# Patient Record
Sex: FEMALE | Race: WHITE | NOT HISPANIC OR LATINO | Employment: UNEMPLOYED | URBAN - METROPOLITAN AREA
[De-identification: names, ages, dates, MRNs, and addresses within clinical notes are randomized per-mention and may not be internally consistent; named-entity substitution may affect disease eponyms.]

---

## 2017-02-06 ENCOUNTER — PATIENT MESSAGE (OUTPATIENT)
Dept: FAMILY MEDICINE | Facility: CLINIC | Age: 62
End: 2017-02-06

## 2017-02-06 RX ORDER — BUPROPION HYDROCHLORIDE 150 MG/1
150 TABLET ORAL DAILY
Qty: 30 TABLET | Refills: 11 | Status: SHIPPED | OUTPATIENT
Start: 2017-02-06 | End: 2017-05-05 | Stop reason: SDUPTHER

## 2017-03-31 ENCOUNTER — TELEPHONE (OUTPATIENT)
Dept: FAMILY MEDICINE | Facility: CLINIC | Age: 62
End: 2017-03-31

## 2017-03-31 DIAGNOSIS — Z12.31 SCREENING MAMMOGRAM, ENCOUNTER FOR: Primary | ICD-10-CM

## 2017-03-31 NOTE — TELEPHONE ENCOUNTER
----- Message from Sandra Chiu sent at 3/31/2017 10:56 AM CDT -----  Contact: self  Pt is requesting a mammo. Please advise. [phone]

## 2017-04-13 DIAGNOSIS — Z11.59 NEED FOR HEPATITIS C SCREENING TEST: ICD-10-CM

## 2017-05-05 ENCOUNTER — HOSPITAL ENCOUNTER (OUTPATIENT)
Dept: RADIOLOGY | Facility: HOSPITAL | Age: 62
Discharge: HOME | End: 2017-05-05
Attending: INTERNAL MEDICINE
Payer: COMMERCIAL

## 2017-05-05 ENCOUNTER — OFFICE VISIT (OUTPATIENT)
Dept: FAMILY MEDICINE | Facility: CLINIC | Age: 62
End: 2017-05-05
Payer: COMMERCIAL

## 2017-05-05 VITALS
HEIGHT: 62 IN | OXYGEN SATURATION: 95 % | SYSTOLIC BLOOD PRESSURE: 138 MMHG | DIASTOLIC BLOOD PRESSURE: 90 MMHG | TEMPERATURE: 98 F | BODY MASS INDEX: 21.83 KG/M2 | HEART RATE: 78 BPM | WEIGHT: 118.63 LBS

## 2017-05-05 DIAGNOSIS — Z00.00 ROUTINE MEDICAL EXAM: Primary | ICD-10-CM

## 2017-05-05 DIAGNOSIS — E55.9 VITAMIN D DEFICIENCY DISEASE: ICD-10-CM

## 2017-05-05 DIAGNOSIS — F41.9 ANXIETY DISORDER, UNSPECIFIED TYPE: ICD-10-CM

## 2017-05-05 DIAGNOSIS — F32.A DEPRESSION, UNSPECIFIED DEPRESSION TYPE: ICD-10-CM

## 2017-05-05 DIAGNOSIS — G47.00 INSOMNIA, UNSPECIFIED TYPE: ICD-10-CM

## 2017-05-05 DIAGNOSIS — R94.6 ABNORMAL THYROID FUNCTION TEST: ICD-10-CM

## 2017-05-05 DIAGNOSIS — F17.200 TOBACCO USE DISORDER: ICD-10-CM

## 2017-05-05 DIAGNOSIS — F42.9 OBSESSIVE-COMPULSIVE DISORDER, UNSPECIFIED TYPE: ICD-10-CM

## 2017-05-05 DIAGNOSIS — Z12.31 SCREENING MAMMOGRAM, ENCOUNTER FOR: ICD-10-CM

## 2017-05-05 DIAGNOSIS — D75.1 POLYCYTHEMIA, SECONDARY: ICD-10-CM

## 2017-05-05 PROCEDURE — 77067 SCR MAMMO BI INCL CAD: CPT | Mod: 26,,, | Performed by: RADIOLOGY

## 2017-05-05 PROCEDURE — 77067 SCR MAMMO BI INCL CAD: CPT | Mod: TC

## 2017-05-05 PROCEDURE — 77063 BREAST TOMOSYNTHESIS BI: CPT | Mod: 26,,, | Performed by: RADIOLOGY

## 2017-05-05 PROCEDURE — 99999 PR PBB SHADOW E&M-EST. PATIENT-LVL III: CPT | Mod: PBBFAC,,, | Performed by: INTERNAL MEDICINE

## 2017-05-05 PROCEDURE — 99396 PREV VISIT EST AGE 40-64: CPT | Mod: S$GLB,,, | Performed by: INTERNAL MEDICINE

## 2017-05-05 RX ORDER — ZOLPIDEM TARTRATE 10 MG/1
TABLET ORAL
Qty: 30 TABLET | Refills: 5 | Status: SHIPPED | OUTPATIENT
Start: 2017-05-05 | End: 2017-11-20 | Stop reason: SDUPTHER

## 2017-05-05 RX ORDER — SERTRALINE HYDROCHLORIDE 50 MG/1
50 TABLET, FILM COATED ORAL DAILY
Qty: 30 TABLET | Refills: 11 | Status: SHIPPED | OUTPATIENT
Start: 2017-05-05 | End: 2018-04-18

## 2017-05-05 RX ORDER — ALPRAZOLAM 0.25 MG/1
0.25 TABLET ORAL 3 TIMES DAILY PRN
Qty: 60 TABLET | Refills: 5 | Status: SHIPPED | OUTPATIENT
Start: 2017-05-05 | End: 2018-05-16

## 2017-05-05 RX ORDER — BUPROPION HYDROCHLORIDE 150 MG/1
150 TABLET ORAL DAILY
Qty: 30 TABLET | Refills: 11 | Status: SHIPPED | OUTPATIENT
Start: 2017-05-05 | End: 2017-08-25 | Stop reason: SDUPTHER

## 2017-05-05 NOTE — PROGRESS NOTES
CHIEF COMPLAINT:  Physical.                                                                                                                               HISTORY OF PRESENT ILLNESS:  A 62-year-old white female, does a little bit   of exercise at home..  Her mammogram is due.  Cholesterol normal in 2010 and  prior.  She has never yet had a bone density -she called her insurance and apparently is not covered with her 5000 all her deductible.  She has quit smoking for the last couple of months and using electronic cigarettes, due to stress and anxiety she is back smoking again.  Her  does not want to quit.  Her vitamin D levels  previously have been normal and taken off Estratest by gyn -dec libido.  Up-to-date on colonoscopy from 2012 with a 7 year follow-up recommended.  She does feel more depressed.  She has been off Zoloft for quite some time and wants to restart.  She doesn't tolerate a higher dose of Wellbutrin.  We discussed always trying to use smaller doses of Xanax and Ambien and hopefully as we treat her depression she will be able to do so in 6-8 weeks                                                                                            ROS:   CONST: weight stable. EYES: no vision change. ENT: no sore throat. CV: no chest pain w/ exertion. RESP: no shortness of breath. GI: no nausea, vomiting, diarrhea. No dysphagia. : no urinary issues. MUSCULOSKELETAL: no new myalgias or arthralgias. SKIN: no new changes. NEURO: no focal deficits. PSYCH: no new issues. ENDOCRINE: no polyuria. HEME: no lymph nodes. ALLERGY: no general pruritis.                                 PAST MEDICAL HISTORY:                                                        1.  Collagenous colitis, on and off steroids for about two years,  Scope around 2002 with Dr. Hart. Mild chronic inflammation on scope 6/12- 7 yrs                                  2.  History of elevated TSH.                                                  3.  Total hysterectomy for benign reasons and endometriosis found.           4.  C-sections x 2.                                                          5.  Active smoker.                                                           6.  History of depression.                                                   7.  Secondary polycythemia, seen by Hematology.                              8.  Acne, followed by Dermatology.  9.  Vit D def- Dx   10. BMD Not yet done  11.  Obsessive-compulsive disorder  12.  Anxiety disorder                                                                                                                        SOCIAL HISTORY:  Did Smoke half pack per day -4 cigs now.  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  Drinks 3-4 times per week.  .                                                                                                           FAMILY HISTORY:  Mom had benign pancreas growths removed and diabetes.       Father  at 78 of hypertension and diabetes.  One brother, three sisters  and one of her sisters has diabetes.                                                                                                    PHYSICAL EXAMINATION:                                                        VITAL SIGNS: As above,  weight 123.                       GENERAL:  Pleasant, fit appearing.                                           HEENT:  Conjunctivae clear.  Pupils equal and reactive.  Nose and mouth      clear and moist.  Teeth good.                                                NECK:  Supple.  Thyroid nonpalpable.                                         RESPIRATORY:  Effort is good.                                                LUNGS:  Clear.                                                               HEART:  Regular rate and rhythm without murmurs, gallops or rubs.  No        carotid bruits.  No edema.                                                   ABDOMEN:   "Soft, nondistended, nontender.  No hepatosplenomegaly or masses.   SKIN:  No rashes.  Warm to touch.                                            EXTREMITIES:  Gait normal.  Digits without clubbing or cyanosis.                                                                                          Prior labs and x-ray reports reviewed.                                                                                                                    Marcie SOSA was seen today for annual exam.    Diagnoses and all orders for this visit:    Routine medical exam, hopefully will get a bone density in the future but her financial coverage does not allow right now.  Up-to-date on colonoscopy.  Check labs.  Work on smoking cessation which I think may be more possible after we treat her depression  -     TSH; Future  -     Lipid panel; Future  -     CBC auto differential; Future  -     Comprehensive metabolic panel; Future  -     Vitamin D; Future    Screening mammogram, encounter for  -     Scheduled for today    Polycythemia, secondary, reassess    Tobacco use disorder    Vitamin D deficiency disease, reassess    Abnormal thyroid function test, reassess    Anxiety disorder, unspecified type, uncontrolled right now we will add back her Zoloft, continue Wellbutrin    Obsessive-compulsive disorder, unspecified type    Insomnia, unspecified type    Depression, unspecified depression type    Other orders  -     sertraline (ZOLOFT) 50 MG tablet; Take 1 tablet (50 mg total) by mouth once daily.  -     alprazolam (XANAX) 0.25 MG tablet; Take 1 tablet (0.25 mg total) by mouth 3 (three) times daily as needed.  -     buPROPion (WELLBUTRIN XL) 150 MG TB24 tablet; Take 1 tablet (150 mg total) by mouth once daily.  -     zolpidem (AMBIEN) 10 mg Tab; Take 1/2 to 1 tablet by mouth at bedtime as needed     Clinical note will be sensitive based on the above psychiatric issues"This note will not be shared with the patient."  "

## 2017-08-24 RX ORDER — BUPROPION HYDROCHLORIDE 150 MG/1
150 TABLET ORAL DAILY
Qty: 30 TABLET | Refills: 11 | Status: CANCELLED | OUTPATIENT
Start: 2017-08-24 | End: 2018-08-24

## 2017-08-24 NOTE — TELEPHONE ENCOUNTER
----- Message from Jaky Grullon sent at 8/23/2017  4:37 PM CDT -----  Contact: Self   Patient says she need a PA to have her medication refilled. Please call at [phone]    buPROPion (WELLBUTRIN XL) 150 MG TB24 tablet    US scripts( Smoking Cessation )

## 2017-08-25 ENCOUNTER — PATIENT MESSAGE (OUTPATIENT)
Dept: FAMILY MEDICINE | Facility: CLINIC | Age: 62
End: 2017-08-25

## 2017-08-25 RX ORDER — BUPROPION HYDROCHLORIDE 150 MG/1
150 TABLET ORAL DAILY
Qty: 30 TABLET | Refills: 11 | Status: SHIPPED | OUTPATIENT
Start: 2017-08-25 | End: 2018-04-18

## 2017-08-25 NOTE — TELEPHONE ENCOUNTER
----- Message from Roxana Khan sent at 8/25/2017  9:59 AM CDT -----  Contact: self  Pt is checking on the status on a PA for her [place], she call insurance and told her that they never received anything. Pt said to ca insurance [phone] give them a verbal and tell them that it is urgent. Thanks......Josephine

## 2017-08-28 ENCOUNTER — PATIENT MESSAGE (OUTPATIENT)
Dept: FAMILY MEDICINE | Facility: CLINIC | Age: 62
End: 2017-08-28

## 2017-08-28 ENCOUNTER — TELEPHONE (OUTPATIENT)
Dept: FAMILY MEDICINE | Facility: CLINIC | Age: 62
End: 2017-08-28

## 2017-08-28 NOTE — TELEPHONE ENCOUNTER
----- Message from Maryana Cantu sent at 8/25/2017  4:04 PM CDT -----  Contact: [facility]  Pharmacist calling regarding pt meds. Please call [phone]

## 2017-08-28 NOTE — TELEPHONE ENCOUNTER
PA completed. This insurance plan only cover 196 days. Patient is at 180 days. Possible denial. Only has 16 more days. Patient is aware.

## 2017-08-28 NOTE — TELEPHONE ENCOUNTER
Me   to Marcie Herrera           7:44 AM   Good morning     Your PA was done. Your insurance advised me that your plan only cover 196 days. You are currently at 180 days. Their is a possibility that your PA can be denied. You only have 16 more days left. Please give your insurance 24-48 hours to make a decision. They will contact you. If you need any further assistance concerning this matter please contact office.

## 2017-08-30 ENCOUNTER — OFFICE VISIT (OUTPATIENT)
Dept: FAMILY MEDICINE | Facility: CLINIC | Age: 62
End: 2017-08-30
Payer: COMMERCIAL

## 2017-08-30 VITALS
HEIGHT: 62 IN | BODY MASS INDEX: 22.68 KG/M2 | SYSTOLIC BLOOD PRESSURE: 110 MMHG | OXYGEN SATURATION: 96 % | WEIGHT: 123.25 LBS | DIASTOLIC BLOOD PRESSURE: 70 MMHG | TEMPERATURE: 98 F | HEART RATE: 75 BPM

## 2017-08-30 DIAGNOSIS — M67.911 TENDINOPATHY OF ROTATOR CUFF, RIGHT: Primary | ICD-10-CM

## 2017-08-30 DIAGNOSIS — G56.01 CARPAL TUNNEL SYNDROME OF RIGHT WRIST: ICD-10-CM

## 2017-08-30 PROCEDURE — 99999 PR PBB SHADOW E&M-EST. PATIENT-LVL IV: CPT | Mod: PBBFAC,,, | Performed by: NURSE PRACTITIONER

## 2017-08-30 PROCEDURE — 3008F BODY MASS INDEX DOCD: CPT | Mod: S$GLB,,, | Performed by: NURSE PRACTITIONER

## 2017-08-30 PROCEDURE — 99214 OFFICE O/P EST MOD 30 MIN: CPT | Mod: S$GLB,,, | Performed by: NURSE PRACTITIONER

## 2017-08-30 RX ORDER — NAPROXEN 500 MG/1
500 TABLET ORAL 2 TIMES DAILY WITH MEALS
Qty: 60 TABLET | Refills: 2 | Status: SHIPPED | OUTPATIENT
Start: 2017-08-30 | End: 2017-11-28

## 2017-08-30 RX ORDER — METHYLPREDNISOLONE 4 MG/1
TABLET ORAL
Qty: 1 PACKAGE | Refills: 0 | Status: SHIPPED | OUTPATIENT
Start: 2017-08-30 | End: 2017-09-20

## 2017-08-30 NOTE — PATIENT INSTRUCTIONS
Carpal Tunnel Syndrome    Carpal tunnel syndrome is a painful condition of the wrist and arm. It is caused by pressure on the median nerve.  The median nerve is one of the nerves that give feeling and movement to the hand. It passes through a tunnel in the wrist called the carpal tunnel. This tunnel is made up of bones and ligaments. Narrowing of this tunnel or swelling of the tissues inside the tunnel puts pressure on the median nerve. This causes numbness, pins and needles, or electric shooting pains in your hand and forearm. Often the pain is worse at night and may wake you when you are asleep.  Carpal tunnel syndrome may occur during pregnancy and with use of birth control pills. It is more common in workers who must often bend their wrists. It is also common in people who work with power tools that cause strong vibrations.  Home care  · Rest the painful wrist. Avoid repeated bending of the wrist back and forth. This puts pressure on the median nerve. Avoid using power tools with strong vibrations.  · If you were given a splint, wear it at night while you sleep. You may also wear it during the day for comfort.  · Move your fingers and wrists often to avoid stiffness.  · Elevate your arms on pillows when you lie down.  · Try using the unaffected hand more.  · Try not to hold your wrists in a bent, downward position.  · Sometimes changes in the work place may ease symptoms. If you type most of the day, it may help to change the position of your keyboard or add a wrist support. Your wrist should be in a neutral position and not bent back when typing.  · You may use over-the-counter pain medicine to treat pain and inflammation, unless another medicine was prescribed. Anti-inflammatory pain medicines, such as ibuprofen or naproxen may be more effective than acetaminophen, which treats pain, but not inflammation. If you have chronic liver or kidney disease or ever had a stomach ulcer or GI bleeding, talk with your  doctor before using these medicines.  · Opioid pain medicine will only give temporary relief and does not treat the problem. If pain continues, you may need a shot of a steroid drug into your wrist.  · If the above methods fail, you may need surgery. This will open the carpal tunnel and release the pressure on the trapped nerve.  Follow-up care  Follow up with your healthcare provider, or as advised, if the pain doesnt begin to improve within the next week.  If X-rays were taken, you will be notified of any new findings that may affect your care.  When to seek medical advice  Call your healthcare provider right away if any of these occur:  · Pain not improving with the above treatment  · Fingers or hand become cold, blue, numb, or tingly  · Your whole arm becomes swollen or weak  Date Last Reviewed: 11/23/2015  © 2131-3638 [employer]. [address] [place] [address], [place] [address]. All rights reserved. This information is not intended as a substitute for professional medical care. Always follow your healthcare professional's instructions.

## 2017-08-30 NOTE — PROGRESS NOTES
History of Present Illness   Marcie Herrera is a 62 y.o. woman with medical history as listed below who presents today for evaluation of right wrist and right shoulder pain for about one week. She states one week ago she was using a stapler at work when she felt a sudden pain to right wrist. The pain was sharp and radiating up the forearm. She began wearing a carpal tunnel brace and taking ibuprofen which helps to relieve pain. 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 She also complains of right shoulder pain. Pain is present in the morning when she wakes up, she does sleep on right side. The pain is also present with certain shoulder movements. The pain radiates in to the upper arm. She first noticed the pain after playing the Wii with her grandson. There is no associated numbness or tingling. There is no known injury to either shoulder or wrist and there is no obvious deformity. She has no additional complaints and is otherwise healthy on today's visit.    Past Medical History:   Diagnosis Date    Anxiety     Insomnia        Past Surgical History:   Procedure Laterality Date     SECTION      HYSTERECTOMY         Social History     Social History    Marital status:      Spouse name: N/A    Number of children: N/A    Years of education: N/A     Social History Main Topics    Smoking status: Current Every Day Smoker     Packs/day: 0.25     Years: 20.00    Smokeless tobacco: Never Used    Alcohol use Yes    Drug use: No    Sexual activity: Yes     Partners: Male     Other Topics Concern    None     Social History Narrative    None       Family History   Problem Relation Age of Onset    Diabetes Father     Hypertension Father     Diabetes Mother        Review of Systems  Review of Systems   Constitutional: Negative for chills and fever.   Musculoskeletal: Positive for joint pain (right wrist, right shoulder). Negative for back pain, falls and neck pain.  "  Skin: Negative for itching and rash.   Neurological: Negative for tingling, sensory change, focal weakness and headaches.     A complete review of systems was otherwise negative.    Physical Exam  /70 (BP Location: Right arm, Patient Position: Sitting)   Pulse 75   Temp 97.8 °F (36.6 °C) (Oral)   Ht 5' 2" (1.575 m)   Wt 55.9 kg (123 lb 3.8 oz)   LMP  (Approximate)   SpO2 96%   BMI 22.54 kg/m²   General appearance: alert, appears stated age, cooperative and no distress  Head: Normocephalic, without obvious abnormality, atraumatic  Neck: supple, symmetrical, trachea midline and full ROM with no tenderness  Back: symmetric, no curvature. ROM normal. No CVA tenderness.  Extremities: extremities normal, atraumatic, no cyanosis or edema  Pulses: 2+ and symmetric  Skin: Skin color, texture, turgor normal. No rashes or lesions  Neurologic: Grossly normal  Musculoskeletal: Positive Phalen's test to right wrist. No obvious swelling or deformity, no joint crepitus. Pulse and sensation intact. There is pain with ROM to right shoulder, anterior rotation and elevation. There is no redness, warmth, or obvious deformity. Distal sensation, ROM, and pulses intact. Full ROM to right elbow.    Assessment/Plan  Tendinopathy of rotator cuff, right  Start medrol dose pack, take as instructed.  Avoid heavy lifting or strenuous activity.  May use ice or heat as needed.  RTC PRN for worsening symptoms.  -     methylPREDNISolone (MEDROL DOSEPACK) 4 mg tablet; use as directed  Dispense: 1 Package; Refill: 0    Carpal tunnel syndrome of right wrist  Continue to wear carpal tunnel brace.  Naproxen as needed.  We discussed surgery as a future option for more persistent or worsening pain.  -     naproxen (EC NAPROSYN) 500 MG EC tablet; Take 1 tablet (500 mg total) by mouth 2 (two) times daily with meals.  Dispense: 60 tablet; Refill: 2    She has verbalized understanding and is in agreement with plan of care.  Return if symptoms " worsen or fail to improve.

## 2017-08-31 ENCOUNTER — PATIENT MESSAGE (OUTPATIENT)
Dept: FAMILY MEDICINE | Facility: CLINIC | Age: 62
End: 2017-08-31

## 2017-09-18 ENCOUNTER — TELEPHONE (OUTPATIENT)
Dept: FAMILY MEDICINE | Facility: CLINIC | Age: 62
End: 2017-09-18

## 2017-09-18 NOTE — TELEPHONE ENCOUNTER
----- Message from Lian Moreau sent at 9/8/2017  9:23 AM CDT -----  Contact: self  Pt states she is returning a call. Please call [phone].

## 2017-11-21 RX ORDER — ZOLPIDEM TARTRATE 10 MG/1
TABLET ORAL
Qty: 30 TABLET | Refills: 5 | Status: SHIPPED | OUTPATIENT
Start: 2017-11-21 | End: 2018-05-16

## 2018-01-03 ENCOUNTER — PATIENT MESSAGE (OUTPATIENT)
Dept: FAMILY MEDICINE | Facility: CLINIC | Age: 63
End: 2018-01-03

## 2018-01-03 DIAGNOSIS — G56.00 CARPAL TUNNEL SYNDROME, UNSPECIFIED LATERALITY: Primary | ICD-10-CM

## 2018-01-03 RX ORDER — METHYLPREDNISOLONE 4 MG/1
TABLET ORAL
Qty: 1 PACKAGE | Refills: 0 | Status: SHIPPED | OUTPATIENT
Start: 2018-01-03 | End: 2018-01-24

## 2018-01-31 ENCOUNTER — PATIENT MESSAGE (OUTPATIENT)
Dept: FAMILY MEDICINE | Facility: CLINIC | Age: 63
End: 2018-01-31

## 2018-03-06 DIAGNOSIS — G95.89 LUMBAR EPIDURAL MASS: Primary | ICD-10-CM

## 2018-03-08 ENCOUNTER — HOSPITAL ENCOUNTER (OUTPATIENT)
Dept: RADIOLOGY | Facility: HOSPITAL | Age: 63
Discharge: HOME | End: 2018-03-08
Attending: ORTHOPAEDIC SURGERY
Payer: COMMERCIAL

## 2018-03-08 DIAGNOSIS — G95.89 LUMBAR EPIDURAL MASS: ICD-10-CM

## 2018-03-08 PROCEDURE — A9503 TC99M MEDRONATE: HCPCS

## 2018-03-08 PROCEDURE — 78306 BONE IMAGING WHOLE BODY: CPT | Mod: 26,,, | Performed by: RADIOLOGY

## 2018-03-20 PROBLEM — C79.51 BONE METASTASES: Status: ACTIVE | Noted: 2018-03-20

## 2018-03-20 PROBLEM — K59.01 SLOW TRANSIT CONSTIPATION: Status: ACTIVE | Noted: 2018-03-20

## 2018-03-20 PROBLEM — M89.9 BONE LESION: Status: ACTIVE | Noted: 2018-03-20

## 2018-03-20 PROBLEM — K52.831 COLLAGENOUS COLITIS: Status: ACTIVE | Noted: 2018-03-20

## 2018-03-23 ENCOUNTER — HOSPITAL ENCOUNTER (OUTPATIENT)
Dept: RADIOLOGY | Facility: HOSPITAL | Age: 63
Discharge: HOME | End: 2018-03-23
Attending: INTERNAL MEDICINE
Payer: COMMERCIAL

## 2018-03-23 DIAGNOSIS — M89.9 BONE LESION: ICD-10-CM

## 2018-03-23 DIAGNOSIS — C79.51 BONE METASTASES: ICD-10-CM

## 2018-03-23 DIAGNOSIS — R91.8 HILAR MASS: ICD-10-CM

## 2018-03-23 PROCEDURE — 71260 CT THORAX DX C+: CPT | Mod: TC

## 2018-03-23 PROCEDURE — 71260 CT THORAX DX C+: CPT | Mod: 26,,, | Performed by: RADIOLOGY

## 2018-03-23 PROCEDURE — 74177 CT ABD & PELVIS W/CONTRAST: CPT | Mod: 26,,, | Performed by: RADIOLOGY

## 2018-03-23 PROCEDURE — 74177 CT ABD & PELVIS W/CONTRAST: CPT | Mod: TC

## 2018-03-23 PROCEDURE — 25500020 PHARM REV CODE 255: Performed by: INTERNAL MEDICINE

## 2018-03-23 RX ADMIN — IOHEXOL 75 ML: 350 INJECTION, SOLUTION INTRAVENOUS at 03:03

## 2018-03-23 RX ADMIN — IOHEXOL 15 ML: 300 INJECTION, SOLUTION INTRAVENOUS at 03:03

## 2018-04-17 ENCOUNTER — PATIENT MESSAGE (OUTPATIENT)
Dept: FAMILY MEDICINE | Facility: CLINIC | Age: 63
End: 2018-04-17

## 2018-04-20 ENCOUNTER — LAB VISIT (OUTPATIENT)
Dept: LAB | Facility: HOSPITAL | Age: 63
End: 2018-04-20
Attending: INTERNAL MEDICINE
Payer: COMMERCIAL

## 2018-04-20 DIAGNOSIS — C34.90 LUNG CANCER: Primary | ICD-10-CM

## 2018-04-20 DIAGNOSIS — D63.8 ANEMIA OF CHRONIC DISEASE: ICD-10-CM

## 2018-04-20 LAB
ABO + RH BLD: NORMAL
BASOPHILS # BLD AUTO: 0.03 K/UL
BASOPHILS NFR BLD: 0.5 %
BLD GP AB SCN CELLS X3 SERPL QL: NORMAL
DIFFERENTIAL METHOD: ABNORMAL
EOSINOPHIL # BLD AUTO: 0.3 K/UL
EOSINOPHIL NFR BLD: 4.6 %
ERYTHROCYTE [DISTWIDTH] IN BLOOD BY AUTOMATED COUNT: 15.9 %
HCT VFR BLD AUTO: 25.9 %
HGB BLD-MCNC: 7.8 G/DL
LYMPHOCYTES # BLD AUTO: 0.4 K/UL
LYMPHOCYTES NFR BLD: 6.5 %
MCH RBC QN AUTO: 28.3 PG
MCHC RBC AUTO-ENTMCNC: 30.1 G/DL
MCV RBC AUTO: 94 FL
MONOCYTES # BLD AUTO: 0.7 K/UL
MONOCYTES NFR BLD: 10.5 %
NEUTROPHILS # BLD AUTO: 5 K/UL
NEUTROPHILS NFR BLD: 76.5 %
PLATELET # BLD AUTO: 488 K/UL
PMV BLD AUTO: 8.6 FL
RBC # BLD AUTO: 2.76 M/UL
WBC # BLD AUTO: 6.48 K/UL

## 2018-04-20 PROCEDURE — 86920 COMPATIBILITY TEST SPIN: CPT

## 2018-04-20 PROCEDURE — 85025 COMPLETE CBC W/AUTO DIFF WBC: CPT

## 2018-04-20 PROCEDURE — 36415 COLL VENOUS BLD VENIPUNCTURE: CPT

## 2018-04-20 PROCEDURE — 86850 RBC ANTIBODY SCREEN: CPT

## 2018-04-20 RX ORDER — HYDROCODONE BITARTRATE AND ACETAMINOPHEN 500; 5 MG/1; MG/1
TABLET ORAL
Status: DISCONTINUED | OUTPATIENT
Start: 2018-04-20 | End: 2018-04-20 | Stop reason: HOSPADM

## 2018-04-23 ENCOUNTER — INFUSION (OUTPATIENT)
Dept: INFUSION THERAPY | Facility: HOSPITAL | Age: 63
End: 2018-04-23
Attending: INTERNAL MEDICINE
Payer: COMMERCIAL

## 2018-04-23 VITALS
TEMPERATURE: 98 F | RESPIRATION RATE: 18 BRPM | SYSTOLIC BLOOD PRESSURE: 148 MMHG | DIASTOLIC BLOOD PRESSURE: 68 MMHG | HEART RATE: 84 BPM | OXYGEN SATURATION: 95 %

## 2018-04-23 DIAGNOSIS — C34.90 LUNG CANCER: Primary | ICD-10-CM

## 2018-04-23 LAB
BLD PROD TYP BPU: NORMAL
BLD PROD TYP BPU: NORMAL
BLOOD UNIT EXPIRATION DATE: NORMAL
BLOOD UNIT EXPIRATION DATE: NORMAL
BLOOD UNIT TYPE CODE: 5100
BLOOD UNIT TYPE CODE: 5100
BLOOD UNIT TYPE: NORMAL
BLOOD UNIT TYPE: NORMAL
CODING SYSTEM: NORMAL
CODING SYSTEM: NORMAL
DISPENSE STATUS: NORMAL
DISPENSE STATUS: NORMAL
NUM UNITS TRANS PACKED RBC: NORMAL
NUM UNITS TRANS PACKED RBC: NORMAL

## 2018-04-23 PROCEDURE — 36430 TRANSFUSION BLD/BLD COMPNT: CPT

## 2018-04-23 PROCEDURE — 25000003 PHARM REV CODE 250: Performed by: INTERNAL MEDICINE

## 2018-04-23 PROCEDURE — P9038 RBC IRRADIATED: HCPCS

## 2018-04-23 RX ORDER — ACETAMINOPHEN 325 MG/1
650 TABLET ORAL
Status: COMPLETED | OUTPATIENT
Start: 2018-04-23 | End: 2018-04-23

## 2018-04-23 RX ORDER — HYDROCODONE BITARTRATE AND ACETAMINOPHEN 500; 5 MG/1; MG/1
TABLET ORAL
Status: DISCONTINUED | OUTPATIENT
Start: 2018-04-23 | End: 2018-04-23 | Stop reason: HOSPADM

## 2018-04-23 RX ORDER — DIPHENHYDRAMINE HCL 25 MG
25 CAPSULE ORAL
Status: COMPLETED | OUTPATIENT
Start: 2018-04-23 | End: 2018-04-23

## 2018-04-23 RX ADMIN — ACETAMINOPHEN 650 MG: 325 TABLET ORAL at 11:04

## 2018-04-23 RX ADMIN — DIPHENHYDRAMINE HYDROCHLORIDE 25 MG: 25 CAPSULE ORAL at 11:04

## 2018-04-23 NOTE — PLAN OF CARE
Problem: Patient Care Overview (Adult)  Goal: Plan of Care Review  Outcome: Ongoing (interventions implemented as appropriate)  Tolerated 2u PRBC, VSS. No reactions noted. Discharged in wheelchair, accompanied by .

## 2018-04-30 ENCOUNTER — OFFICE VISIT (OUTPATIENT)
Dept: FAMILY MEDICINE | Facility: CLINIC | Age: 63
End: 2018-04-30
Payer: COMMERCIAL

## 2018-04-30 VITALS
HEIGHT: 62 IN | TEMPERATURE: 98 F | WEIGHT: 121.25 LBS | SYSTOLIC BLOOD PRESSURE: 120 MMHG | HEART RATE: 101 BPM | DIASTOLIC BLOOD PRESSURE: 70 MMHG | OXYGEN SATURATION: 96 % | BODY MASS INDEX: 22.31 KG/M2

## 2018-04-30 DIAGNOSIS — Z12.31 ENCOUNTER FOR SCREENING MAMMOGRAM FOR BREAST CANCER: ICD-10-CM

## 2018-04-30 DIAGNOSIS — Z00.00 ROUTINE MEDICAL EXAM: Primary | ICD-10-CM

## 2018-04-30 DIAGNOSIS — R94.6 ABNORMAL THYROID FUNCTION TEST: ICD-10-CM

## 2018-04-30 DIAGNOSIS — D64.9 ANEMIA, UNSPECIFIED TYPE: ICD-10-CM

## 2018-04-30 DIAGNOSIS — C79.51 BONE METASTASES: ICD-10-CM

## 2018-04-30 DIAGNOSIS — E55.9 VITAMIN D DEFICIENCY DISEASE: ICD-10-CM

## 2018-04-30 DIAGNOSIS — F41.9 ANXIETY DISORDER, UNSPECIFIED TYPE: ICD-10-CM

## 2018-04-30 DIAGNOSIS — C34.90 MALIGNANT NEOPLASM OF LUNG, UNSPECIFIED LATERALITY, UNSPECIFIED PART OF LUNG: ICD-10-CM

## 2018-04-30 PROCEDURE — 99396 PREV VISIT EST AGE 40-64: CPT | Mod: S$GLB,,, | Performed by: INTERNAL MEDICINE

## 2018-04-30 PROCEDURE — 99999 PR PBB SHADOW E&M-EST. PATIENT-LVL III: CPT | Mod: PBBFAC,,, | Performed by: INTERNAL MEDICINE

## 2018-04-30 RX ORDER — TRAZODONE HYDROCHLORIDE 100 MG/1
TABLET ORAL
Refills: 0 | COMMUNITY
Start: 2018-04-13 | End: 2018-05-08 | Stop reason: SDUPTHER

## 2018-04-30 RX ORDER — OXYCODONE HYDROCHLORIDE 20 MG/1
TABLET ORAL
COMMUNITY
Start: 2018-04-13 | End: 2018-05-08 | Stop reason: SDUPTHER

## 2018-04-30 RX ORDER — TIZANIDINE 4 MG/1
TABLET ORAL
Refills: 0 | COMMUNITY
Start: 2018-04-13 | End: 2018-07-31 | Stop reason: SDUPTHER

## 2018-04-30 RX ORDER — TRAMADOL HYDROCHLORIDE 50 MG/1
TABLET ORAL
COMMUNITY
Start: 2018-02-22 | End: 2018-05-08

## 2018-04-30 NOTE — PROGRESS NOTES
CHIEF COMPLAINT:  Physical.                                                                                                                               HISTORY OF PRESENT ILLNESS:  A 63-year-old white female, does a little bit   of exercise at home..  Her mammogram is due but under the circumstances of her metastatic cancer she and her  are wondering if she needs it.  We discussed it may be a component of her workup for her cancer of unknown primary but she does have an upcoming PET scan which may somewhat take place of the mammogram but she will need to discuss this with her oncologist..  Cholesterol normal in 2010 and  prior.  She has never yet had a bone density -she called her insurance and apparently is not covered with her 5000 all her deductible.  She had quit smoking  and was using electronic cigarettes but says she has quit for 9 month. She now has met cancerOf unknown primary presumably from the lungs and is in the process of having this worked out.  We reviewed all available records and tumor markers but we do not have all of the records.  She did have extensive spine surgery secondary to the spine metastases        Her vitamin D levels  previously have been normal and taken off Estratest by gyn -dec libido.  Up-to-date on colonoscopy from 2012 with a 7 year follow-up recommended. she did apparently have another colonoscopy updated while in the hospital at [place]  for depression and anus now.  She is on Cymbalta                                                                                            ROS:   CONST: weight stable. EYES: no vision change. ENT: no sore throat. CV: no chest pain w/ exertion. RESP: no shortness of breath. GI: no nausea, vomiting, diarrhea. No dysphagia. : no urinary issues. MUSCULOSKELETAL: no new myalgias or arthralgias. SKIN: no new changes. NEURO: no focal deficits. PSYCH: no new issues. ENDOCRINE: no polyuria. HEME: no lymph nodes. ALLERGY: no general  pruritis.                                 PAST MEDICAL HISTORY:                                                        1.  Collagenous colitis, on and off steroids for about two years,  Scope around  with Dr. Hart. Mild chronic inflammation on scope - 7 yrs, colonoscopy updated at [place] 2018                                  2.  History of elevated TSH.                                                 3.  Total hysterectomy for benign reasons and endometriosis found.           4.  C-sections x 2.                                                          5.  Active smoker.                                                           6.  History of depression.                                                   7.  Secondary polycythemia, seen by Hematology.                              8.  Acne, followed by Dermatology.  9.  Vit D def- Dx   10. BMD Not yet done  11.  Obsessive-compulsive disorder  12.  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  Drinks 3-4 times per week.  .                                                                                                           FAMILY HISTORY:  Mom had benign pancreas growths removed and diabetes.       Father  at 78 of hypertension and diabetes.  One brother, three sisters  and one of her sisters has diabetes.                                                                                                    PHYSICAL EXAMINATION:                                                        VITAL SIGNS: As above, didn't                       GENERAL:   pleasant, appears that she has loss some weight, sitting still  "secondary to back pain                                          HEENT:  Conjunctivae clear.  Pupils equal and reactive.  Nose and mouth      clear and moist.  Teeth good.                                                NECK:  Supple.  Thyroid nonpalpable.                                         RESPIRATORY:  Effort is good.                                                LUNGS:  Clear.                                                               HEART:  Regular rate and rhythm without murmurs, gallops or rubs.  No        carotid bruits.  No edema.                                                   ABDOMEN:  Soft, nondistended, nontender.  No hepatosplenomegaly or masses.   SKIN:  No rashes.  Warm to touch.                                            EXTREMITIESin wheelchair  Digits without clubbing or cyanosis.                                                                                          Prior labs and x-ray reports reviewed.                                                 Marcie SOSA was seen today for hospital follow up.    Diagnoses and all orders for this visit:    Routine medical exam, patient will discuss with oncology if she absolutely needs mammogram which we discussed could be a component of her cancer primary workup.  Sufficient lab work has been done lately, apparently colonoscopy updated as well    Encounter for screening mammogram for breast cancer    Malignant neoplasm of lung, unspecified laterality, unspecified part of lung, explained to them adenocarcinoma of unknown primary and that even if a primary is not found there likely is a treatment protocol    Bone metastases    Anxiety disorder, unspecified type, on Cymbalta    Abnormal thyroid function test    Vitamin D deficiency disease    Anemia, unspecified type, recent transfusion                                                                              Clinical note will be sensitive based on the above psychiatric issues....."This note " "will not be shared with the patient."  "

## 2018-05-07 PROBLEM — J91.0 MALIGNANT PLEURAL EFFUSION: Status: ACTIVE | Noted: 2018-05-07

## 2018-05-07 PROBLEM — J90 PLEURAL EFFUSION, RIGHT: Status: ACTIVE | Noted: 2018-05-07

## 2018-05-07 PROBLEM — I87.8 POOR VENOUS ACCESS: Status: ACTIVE | Noted: 2018-05-07

## 2018-05-07 PROBLEM — C34.91: Status: ACTIVE | Noted: 2018-05-07

## 2018-05-09 ENCOUNTER — TELEPHONE (OUTPATIENT)
Dept: INTERVENTIONAL RADIOLOGY/VASCULAR | Facility: HOSPITAL | Age: 63
End: 2018-05-09

## 2018-05-16 ENCOUNTER — HOSPITAL ENCOUNTER (OUTPATIENT)
Dept: PREADMISSION TESTING | Facility: HOSPITAL | Age: 63
Discharge: HOME | End: 2018-05-16
Attending: RADIOLOGY
Payer: COMMERCIAL

## 2018-05-16 VITALS
TEMPERATURE: 97 F | WEIGHT: 118 LBS | DIASTOLIC BLOOD PRESSURE: 65 MMHG | RESPIRATION RATE: 18 BRPM | HEIGHT: 62 IN | BODY MASS INDEX: 21.71 KG/M2 | SYSTOLIC BLOOD PRESSURE: 90 MMHG | HEART RATE: 99 BPM

## 2018-05-16 DIAGNOSIS — Z01.818 PRE-OP TESTING: ICD-10-CM

## 2018-05-16 DIAGNOSIS — I87.8 POOR VENOUS ACCESS: Primary | ICD-10-CM

## 2018-05-16 LAB
ALBUMIN SERPL BCP-MCNC: 2.6 G/DL
ALP SERPL-CCNC: 109 U/L
ALT SERPL W/O P-5'-P-CCNC: 13 U/L
ANION GAP SERPL CALC-SCNC: 10 MMOL/L
AST SERPL-CCNC: 24 U/L
BASOPHILS # BLD AUTO: 0.01 K/UL
BASOPHILS NFR BLD: 0.2 %
BILIRUB SERPL-MCNC: 0.4 MG/DL
BUN SERPL-MCNC: 11 MG/DL
CALCIUM SERPL-MCNC: 9.5 MG/DL
CHLORIDE SERPL-SCNC: 94 MMOL/L
CO2 SERPL-SCNC: 30 MMOL/L
CREAT SERPL-MCNC: 0.7 MG/DL
DIFFERENTIAL METHOD: ABNORMAL
EOSINOPHIL # BLD AUTO: 0.2 K/UL
EOSINOPHIL NFR BLD: 3.1 %
ERYTHROCYTE [DISTWIDTH] IN BLOOD BY AUTOMATED COUNT: 19.1 %
EST. GFR  (AFRICAN AMERICAN): >60 ML/MIN/1.73 M^2
EST. GFR  (NON AFRICAN AMERICAN): >60 ML/MIN/1.73 M^2
GLUCOSE SERPL-MCNC: 100 MG/DL
HCT VFR BLD AUTO: 38.5 %
HGB BLD-MCNC: 11.8 G/DL
INR PPP: 1.3
LYMPHOCYTES # BLD AUTO: 0.7 K/UL
LYMPHOCYTES NFR BLD: 11.4 %
MCH RBC QN AUTO: 28.2 PG
MCHC RBC AUTO-ENTMCNC: 30.6 G/DL
MCV RBC AUTO: 92 FL
MONOCYTES # BLD AUTO: 0.7 K/UL
MONOCYTES NFR BLD: 11.4 %
NEUTROPHILS # BLD AUTO: 4.8 K/UL
NEUTROPHILS NFR BLD: 73.9 %
PLATELET # BLD AUTO: 284 K/UL
PMV BLD AUTO: 9.1 FL
POTASSIUM SERPL-SCNC: 3.4 MMOL/L
PROT SERPL-MCNC: 7 G/DL
PROTHROMBIN TIME: 13.1 SEC
RBC # BLD AUTO: 4.18 M/UL
SODIUM SERPL-SCNC: 134 MMOL/L
WBC # BLD AUTO: 6.5 K/UL

## 2018-05-16 PROCEDURE — 85610 PROTHROMBIN TIME: CPT

## 2018-05-16 PROCEDURE — 80053 COMPREHEN METABOLIC PANEL: CPT

## 2018-05-16 PROCEDURE — 85025 COMPLETE CBC W/AUTO DIFF WBC: CPT

## 2018-05-16 RX ORDER — ONDANSETRON 4 MG/1
8 TABLET, ORALLY DISINTEGRATING ORAL EVERY 12 HOURS PRN
COMMUNITY
End: 2018-06-14 | Stop reason: SDUPTHER

## 2018-05-16 RX ORDER — MIRTAZAPINE 15 MG/1
15 TABLET, FILM COATED ORAL NIGHTLY
COMMUNITY
End: 2018-06-19 | Stop reason: SDUPTHER

## 2018-05-16 RX ORDER — DOCUSATE SODIUM 100 MG/1
100 CAPSULE, LIQUID FILLED ORAL 2 TIMES DAILY
COMMUNITY
End: 2018-08-16 | Stop reason: SDUPTHER

## 2018-05-16 NOTE — DISCHARGE INSTRUCTIONS
Your surgery is scheduled for____5/18/2018_____________.      Report to SAME DAY SURGERY UNIT at __7:00_____am on the 2nd floor of the hospital.  Use the front entrance of the hospital before 6 am.  If you need wheelchair assistance, call [phone] from your cell phone,  or call 0 from the courtesy phone in the hospital lobby.    Important instructions:   Do not eat or drink after 12 midnight, including water.  It is okay to brush your teeth.  Do not have gum, candy or mints.     Take your morning medications as usual with small sips of water.      Stop taking Aspirin, Ibuprofen, Motrin and Aleve , Fish oil, and Vitamin E for at least 7 days before your surgery. You may use Tylenol unless otherwise instructed by your doctor.       Please shower the night before and the morning of your surgery.        Use Hibiclens soap to your surgery site if instructed by your pre op nurse.   If your surgery is on your abdomen, be sure to wash your naval.  Be sure to rinse off Hibiclens after it is on your skin for several minutes.  Do not use Hibiclens on your face or genitals. Please place clean linens on your bed the night before surgery. Please wear fresh clean clothing after each shower.     Do not wear make- up, including mascara.     You may wear deodorant only.      Do not wear powder, body lotion or cologne.     Do not wear any jewelry or have any metal on your body.     Please bring any documents given to you by your doctor.     If you are going home on the same day of surgery, you must have arrangements for a ride home.  You will not be able to drive home if you were given anesthesia or sedation.     Wear loose fitting clothes allowing for bandages.     Please leave money and valuables home.       You may bring your cell phone.     Call the doctor if fever or illness should occur before your surgery.    Call [phone] to contact us here at [facility] if needed.

## 2018-05-18 ENCOUNTER — HOSPITAL ENCOUNTER (OUTPATIENT)
Facility: HOSPITAL | Age: 63
Discharge: HOME | End: 2018-05-18
Attending: RADIOLOGY | Admitting: RADIOLOGY
Payer: COMMERCIAL

## 2018-05-18 VITALS
HEIGHT: 62 IN | WEIGHT: 118 LBS | DIASTOLIC BLOOD PRESSURE: 76 MMHG | TEMPERATURE: 97 F | HEART RATE: 93 BPM | BODY MASS INDEX: 21.71 KG/M2 | RESPIRATION RATE: 20 BRPM | SYSTOLIC BLOOD PRESSURE: 128 MMHG | OXYGEN SATURATION: 93 %

## 2018-05-18 DIAGNOSIS — I87.8 POOR VENOUS ACCESS: ICD-10-CM

## 2018-05-18 DIAGNOSIS — J90 PLEURAL EFFUSION, RIGHT: ICD-10-CM

## 2018-05-18 PROBLEM — R09.02 HYPOXIA: Status: ACTIVE | Noted: 2018-05-18

## 2018-05-18 PROCEDURE — 99000 SPECIMEN HANDLING OFFICE-LAB: CPT

## 2018-05-18 PROCEDURE — 63600175 PHARM REV CODE 636 W HCPCS: Performed by: RADIOLOGY

## 2018-05-18 PROCEDURE — 25000003 PHARM REV CODE 250: Performed by: RADIOLOGY

## 2018-05-18 RX ORDER — IBUPROFEN 400 MG/1
800 TABLET ORAL ONCE
Status: COMPLETED | OUTPATIENT
Start: 2018-05-18 | End: 2018-05-18

## 2018-05-18 RX ORDER — MIDAZOLAM HYDROCHLORIDE 1 MG/ML
INJECTION INTRAMUSCULAR; INTRAVENOUS CODE/TRAUMA/SEDATION MEDICATION
Status: COMPLETED | OUTPATIENT
Start: 2018-05-18 | End: 2018-05-18

## 2018-05-18 RX ORDER — CEFAZOLIN SODIUM 1 G/50ML
SOLUTION INTRAVENOUS
Status: COMPLETED | OUTPATIENT
Start: 2018-05-18 | End: 2018-05-18

## 2018-05-18 RX ADMIN — CEFAZOLIN SODIUM 1 G: 1 SOLUTION INTRAVENOUS at 09:05

## 2018-05-18 RX ADMIN — MIDAZOLAM HYDROCHLORIDE 1 MG: 1 INJECTION, SOLUTION INTRAMUSCULAR; INTRAVENOUS at 09:05

## 2018-05-18 RX ADMIN — IBUPROFEN 800 MG: 400 TABLET, FILM COATED ORAL at 01:05

## 2018-05-18 NOTE — DISCHARGE INSTRUCTIONS
BATHING:  ? You may shower tomorrow.  DRESSING:  ? Remove dressing tomorrow.        ACTIVITY LEVEL: If you have received sedation or an anesthetic, you may feel sleepy for several hours. Rest until you are more awake. Gradually resume your normal activities    Do not drive, drink alcohol, or sign legal documents for 24 hours, or if taking narcotic pain medication.      DIET: You may resume your home diet. If nausea is present, increase your diet gradually with fluids and bland foods.    Medications: Pain medication should be taken only if needed and as directed. If antibiotics are prescribed, the medication should be taken until completed. You will be given an updated list of you medications.  ? No driving, alcoholic beverages or signing legal documents for next 24 hours if you have had sedation, or while taking pain medication    CALL THE DOCTOR:   For any obvious bleeding (some dried blood over the incision is normal).     Redness, swelling, foul smell around incision or fever over 101.  Shortness of breath.  Persistent pain or nausea not relieved by medication.  Call  [phone]     to speak with an Interventional Radiologist    If any unusual problems or difficulties occur contact your doctor. If you cannot contact your doctor but feel your signs and symptoms warrant a physicians attention return to the emergency room.  Wear 2 L of oxygen     Fall Prevention  Millions of people fall every year and injure themselves. You may have had anesthesia or sedation which may increase your risk of falling. You may have health issues that put you at an increased risk of falling.     Here are ways to reduce your risk of falling.  ·   · Make your home safe by keeping walkways clear of objects you may trip over.  · Use non-slip pads under rugs. Do not use area rugs or small throw rugs.  · Use non-slip mats in bathtubs and showers.  · Install handrails and lights on staircases.  · Do not walk in poorly lit areas.  · Do not stand  on chairs or wobbly ladders.  · Use caution when reaching overhead or looking upward. This position can cause a loss of balance.  · Be sure your shoes fit properly, have non-slip bottoms and are in good condition.   · Wear shoes both inside and out. Avoid going barefoot or wearing slippers.  · Be cautious when going up and down stairs, curbs, and when walking on uneven sidewalks.  · If your balance is poor, consider using a cane or walker.  · If your fall was related to alcohol use, stop or limit alcohol intake.   · If your fall was related to use of sleeping medicines, talk to your doctor about this. You may need to reduce your dosage at bedtime if you awaken during the night to go to the bathroom.    · To reduce the need for nighttime bathroom trips:  ¨ Avoid drinking fluids for several hours before going to bed  ¨ Empty your bladder before going to bed  ¨ Men can keep a urinal at the bedside  · Stay as active as you can. Balance, flexibility, strength, and endurance all come from exercise. They all play a role in preventing falls. Ask your healthcare provider which types of activity are right for you.  · Get your vision checked on a regular basis.  · If you have pets, know where they are before you stand up or walk so you don't trip over them.  Use night lights.      Vascular Access Port Implantation   Port implantation is surgery to place (implant) a port under the skin. For vascular access, it is placed into a vein. The port allows medicines or nutrition to be sent right into your bloodstream. Blood can also be taken or given through the port. During the procedure, a long, thin tube called a catheter is threaded into one of your large veins. The tube is then attached to the port. This usually sits under the skin of your chest and causes a small bump. To use the port, a special needle is passed through your skin and into the port. The needle can stay in your skin for up to 7 days, if needed. A port can stay in  place for weeks or months or longer.    Why is a vascular access port needed?  A vascular access port may allow healthcare providers to give you:  · Chemotherapy or other cancer-fighting drugs  · IV treatments, such as antibiotics or nutrition  · Hemodialysis (for kidney failure)  The port may also be used to draw blood.  Before the procedure  Follow any instructions you are given on how to prepare.  Tell your provider about any medicines you are taking. This includes:  · All prescription medicines  · Over-the-counter medicines such as aspirin or ibuprofen  · Herbs, vitamins, and other supplements  Also be sure your provider knows:  · If you are pregnant or think you may be pregnant  · If you are allergic to any medicines or substances, especially local anesthetics or iodine  · Your full medical history, including why you will need the port  · If you plan on doing any contact sports  During the procedure  · Before the procedure, an IV may be put into a vein in your arm or hand. This gives you fluids and medicines. You may be given medicine through the IV to help you relax during the procedure. This is called sedation. But some surgeons place ports using general anesthesia.  · The chest is used most often for the port. In some cases, your belly (abdomen) or arm will be used instead.  · The skin over the insertion area is numbed with local anesthetic.  · Ultrasound or X-rays are used to help the healthcare provider guide the catheter into the proper location during the procedure.  · A cut (incision) is made in the skin where the port will be placed. A small pocket for the port is formed under the skin.  · A second small incision is made in the skin near the first incision. A tunnel under the skin is created. The catheter is put through the tunnel and into the blood vessel.  · The skin is closed over the port. It is held shut with stitches (sutures) or surgical glue or tape. The second small incision is also  closed.  · A chest X-ray may be done to make sure the port is placed properly.  After the procedure  You may be taken to a recovery room where youll recover from the sedation. Nurses will check on you as you rest. If you have pain, nurses can give you medicine. If you are not staying in the hospital overnight, you will be sent home a few hours after the procedure is done. A healthcare provider will tell you when you can go home. An adult family member or friend will need to drive you home.  Recovering at home  · Take pain medicine as directed by your healthcare provider.  · Take it easy for 24 hours after the procedure. Avoid physical activity and heavy lifting until your healthcare provider says its OK.  · Keep the port clean and dry. Ask when you can shower again. You will need to keep the port dry by covering it when you shower.  · Care for the insertion site as you are directed.  · Dont swim, bathe, or do other activities that cause water to cover the insertion site.  · To keep the port from getting blocked with blood clots, flush it as often as directed. You should be shown the proper way to flush the port before you go home. It is important to follow these directions.     Risks and possible complications of implantation  · Bleeding  · Infection of the insertion site  · Damage to a blood vessel  · Nerve injury or irritation  · Collapsed lung (for chest port placements)  · Skin breakdown over the port  Risks and possible complications of having a port  · Blocked  port or catheter  · Leakage or breakage of the port or catheter  · The port moves out of position  · Blood clot  · Skin or bloodstream infection  · Skin breakdown over the port      When to seek medical care  Call your healthcare provider right away if you have any of the following:  · A fever of 100.4°F (38.0°C) or higher  · You can't access or use the port properly  · You can't flush the port or get a blood return  · The skin near the port is red,  warm, swollen, or broken  · You have shoulder pain on the side where the port is located  · You feel a heart flutter or racing heart   · Swollen arm, if the port is placed in your arm   Date Last Reviewed: 7/1/2016  © 9646-2933 [employer]. [address] [place] [address]. All rights reserved. This information is not intended as a substitute for professional medical care. Always follow your healthcare professional's instructions.

## 2018-05-18 NOTE — OR NURSING
[facility] is present for O2 tant delivery. 1650 [employer] is present with portable O2. Instructions given to .

## 2018-05-18 NOTE — H&P
Radiology History & Physical      SUBJECTIVE:     Chief Complaint: adenocarcinoma    History of Present Illness:  Marcie Herrera is a 63 y.o. female who presents for port placement.  Patient has metastatic adenocarcinoma - unknown primary.     Thoracentesis requested however available imaging only shows trace right pleural fluid.    Past Medical History:   Diagnosis Date    Anxiety     Cancer     lung    Insomnia      Past Surgical History:   Procedure Laterality Date     SECTION      HYSTERECTOMY      THORACIC FUSION       Home Meds:   Prior to Admission medications    Medication Sig Start Date End Date Taking? Authorizing Provider   docusate sodium (COLACE) 100 MG capsule Take 100 mg by mouth 2 (two) times daily.   Yes Historical Provider, MD   DULoxetine (CYMBALTA) 30 MG capsule Take 1 capsule (30 mg total) by mouth 2 (two) times daily. 18 Yes Elder Olsen MD   fentaNYL (DURAGESIC) 75 mcg/hr Place 1 patch onto the skin every 72 hours. 18 Yes Elder Olsen MD   gabapentin (NEURONTIN) 100 MG capsule Take 1 capsule (100 mg total) by mouth 3 (three) times daily. 18 Yes Elder Olsen MD   mirtazapine (REMERON) 15 MG tablet Take 15 mg by mouth every evening.   Yes Historical Provider, MD   ondansetron (ZOFRAN-ODT) 4 MG TbDL Take 8 mg by mouth every 12 (twelve) hours as needed.   Yes Historical Provider, MD   oxyCODONE (ROXICODONE) 20 mg Tab immediate release tablet Take 1 tablet (20 mg total) by mouth every 6 (six) hours as needed for Pain. 18  Yes Elder Olsen MD   tiZANidine (ZANAFLEX) 4 MG tablet TK 1 T PO  BID 18  Yes Historical Provider, MD   traZODone (DESYREL) 100 MG tablet TK 1 T PO  QHS 18  Yes Elder Olsen MD   ibuprofen (ADVIL,MOTRIN) 800 MG tablet Take 1 tablet (800 mg total) by mouth 2 (two) times daily as needed for Temperature greater than. 18   Elder Olsen MD     Anticoagulants/Antiplatelets: no  anticoagulation    Allergies: Review of patient's allergies indicates:  No Known Allergies  Sedation History:  no adverse reactions    Review of Systems:   Hematological: no known coagulopathies  Respiratory: no shortness of breath  Cardiovascular: no chest pain  Gastrointestinal: no abdominal pain  Genito-Urinary: no dysuria  Musculoskeletal: negative  Neurological: no TIA or stroke symptoms         OBJECTIVE:     Vital Signs (Most Recent)  Temp: 98.2 °F (36.8 °C) (05/18/18 0715)  Pulse: 64 (05/18/18 0737)  Resp: 18 (05/18/18 0715)  BP: (!) 108/58 (05/18/18 0715)  SpO2: (!) 86 % (05/18/18 0800)    Physical Exam:  ASA: 3  Mallampati: 2    General: no acute distress  Mental Status: alert and oriented to person, place and time  HEENT: normocephalic, atraumatic  Chest: unlabored breathing  Heart: regular heart rate  Abdomen: nondistended  Extremity: moves all extremities    Laboratory  Lab Results   Component Value Date    INR 1.3 (H) 05/16/2018       Lab Results   Component Value Date    WBC 6.50 05/16/2018    HGB 11.8 (L) 05/16/2018    HCT 38.5 05/16/2018    MCV 92 05/16/2018     05/16/2018      Lab Results   Component Value Date     05/16/2018     (L) 05/16/2018    K 3.4 (L) 05/16/2018    CL 94 (L) 05/16/2018    CO2 30 (H) 05/16/2018    BUN 11 05/16/2018    CREATININE 0.7 05/16/2018    CALCIUM 9.5 05/16/2018    ALT 13 05/16/2018    AST 24 05/16/2018    ALBUMIN 2.6 (L) 05/16/2018    BILITOT 0.4 05/16/2018       ASSESSMENT/PLAN:     63 year old female with metastatic adenocarcinoma of unknown primary.    Chest port placement.  Will get chest xray and perform thoracentesis if sufficient fluid.    Sedation Plan: moderate.    Jason Fuller MD  Neurointerventional Fellow  Department of Radiology  [phone]

## 2018-05-18 NOTE — PLAN OF CARE
Spoke with Britt at Dr. Olsen's office, stated she  spoke with Deni concerning O2 delivery  To hospital.

## 2018-05-18 NOTE — PROCEDURES
Radiology Post-Procedure Note    Pre Op Diagnosis: adenocarcinoma, right pleural effusion    Post Op Diagnosis: Same    Procedure: PORT placement, right sided thoracentesis    Procedure performed by: Dr. Fuller    Written Informed Consent Obtained: Yes    Specimen Removed: No    Estimated Blood Loss: Minimal    Findings:   Using realtime U/S guidance a right chest port was placed tunneled to the right IJ.  Right sided thoracentesis performed - 900 cc clear yellow fluid removed, sample sent for pathological analysis given undiagnosed primary.    Successful right thoracentesis and chest port placement - port is ready for use.     Jason Fuller MD  Neurointerventional Fellow  Department of Radiology  [phone]

## 2018-05-18 NOTE — PLAN OF CARE
Call to Dr Olsen regarding continued low oxygen saturation on room air. Patient was in 70's-80's 02 sats upon arrival. Color is pink, no acute changes in orientation,felt some SOB,  . Went to radiology on oxygen and was on nonrebreather mask during port placement. Post thoracentesis patient on 4 liters nasal cannula with 02 saturations 91-96 % . During weaning of oxygen she was in low 90's on 2 liters. Upon room air , her o2 saturation was 83-86%. Upon exertion it dropped to low to mid 70%. Patient  placed back on 2 liters oxygen nasal cannula as per Dr Olsen . He is trying to set up home oxygen. Call to Dr mayer, interventional radiologist . He is in agreement with this plan.However, patient feels she is at her baseline. She and her  are willing to wait a while during this planning.  Dr Mayer has cleared her to take ibuprofen and her home meds.

## 2018-06-04 NOTE — DISCHARGE SUMMARY
Radiology Discharge Summary      Hospital Course: No complications    Admit Date: 5/18/2018  Discharge Date: 06/04/2018     Instructions Given to Patient: Yes  Diet: Resume prior diet  Activity: activity as tolerated    Description of Condition on Discharge: Stable  Vital Signs (Most Recent): Temp: 97.3 °F (36.3 °C) (05/18/18 1520)  Pulse: 93 (05/18/18 1520)  Resp: 20 (05/18/18 1520)  BP: 128/76 (05/18/18 1520)  SpO2: (!) 93 % (05/18/18 1520)    Discharge Disposition: Home    Discharge Diagnosis: adenocarcinoma.  Status post port placement and right thoracentesis.     Follow-up: as scheduled    Jason Fuller MD  Neurointerventional Fellow  Department of Radiology  [phone]

## 2018-06-06 ENCOUNTER — HOSPITAL ENCOUNTER (INPATIENT)
Facility: HOSPITAL | Age: 63
LOS: 2 days | Discharge: HOME | DRG: 813 | End: 2018-06-08
Attending: EMERGENCY MEDICINE | Admitting: EMERGENCY MEDICINE
Payer: COMMERCIAL

## 2018-06-06 DIAGNOSIS — T45.1X5A CHEMOTHERAPY-INDUCED NEUTROPENIA: ICD-10-CM

## 2018-06-06 DIAGNOSIS — D70.1 CHEMOTHERAPY-INDUCED NEUTROPENIA: ICD-10-CM

## 2018-06-06 DIAGNOSIS — D70.8 OTHER NEUTROPENIA: ICD-10-CM

## 2018-06-06 DIAGNOSIS — C34.90 METASTATIC LUNG CANCER (METASTASIS FROM LUNG TO OTHER SITE), UNSPECIFIED LATERALITY: ICD-10-CM

## 2018-06-06 DIAGNOSIS — R53.1 WEAKNESS: ICD-10-CM

## 2018-06-06 DIAGNOSIS — T45.1X5A CHEMOTHERAPY-INDUCED THROMBOCYTOPENIA: Primary | ICD-10-CM

## 2018-06-06 DIAGNOSIS — D69.6 THROMBOCYTOPENIA: ICD-10-CM

## 2018-06-06 DIAGNOSIS — R09.02 HYPOXIA: ICD-10-CM

## 2018-06-06 DIAGNOSIS — D69.59 CHEMOTHERAPY-INDUCED THROMBOCYTOPENIA: Primary | ICD-10-CM

## 2018-06-06 PROBLEM — D61.810 ANTINEOPLASTIC CHEMOTHERAPY INDUCED PANCYTOPENIA: Status: ACTIVE | Noted: 2018-06-06

## 2018-06-06 PROBLEM — G89.3 CANCER ASSOCIATED PAIN: Status: ACTIVE | Noted: 2018-06-06

## 2018-06-06 PROBLEM — R53.81 DEBILITY: Status: ACTIVE | Noted: 2018-06-06

## 2018-06-06 LAB
ABO + RH BLD: NORMAL
ALBUMIN SERPL BCP-MCNC: 2.4 G/DL
ALP SERPL-CCNC: 87 U/L
ALT SERPL W/O P-5'-P-CCNC: 73 U/L
ANION GAP SERPL CALC-SCNC: 10 MMOL/L
AST SERPL-CCNC: 111 U/L
BASOPHILS # BLD AUTO: 0 K/UL
BASOPHILS NFR BLD: 0 %
BILIRUB SERPL-MCNC: 0.5 MG/DL
BLD GP AB SCN CELLS X3 SERPL QL: NORMAL
BLD PROD TYP BPU: NORMAL
BLOOD UNIT EXPIRATION DATE: NORMAL
BLOOD UNIT TYPE CODE: 6200
BLOOD UNIT TYPE: NORMAL
BUN SERPL-MCNC: 7 MG/DL
CALCIUM SERPL-MCNC: 9.4 MG/DL
CHLORIDE SERPL-SCNC: 98 MMOL/L
CO2 SERPL-SCNC: 31 MMOL/L
CODING SYSTEM: NORMAL
CREAT SERPL-MCNC: 0.7 MG/DL
DIFFERENTIAL METHOD: ABNORMAL
DISPENSE STATUS: NORMAL
EOSINOPHIL # BLD AUTO: 0 K/UL
EOSINOPHIL NFR BLD: 0 %
ERYTHROCYTE [DISTWIDTH] IN BLOOD BY AUTOMATED COUNT: 17.5 %
EST. GFR  (AFRICAN AMERICAN): >60 ML/MIN/1.73 M^2
EST. GFR  (NON AFRICAN AMERICAN): >60 ML/MIN/1.73 M^2
GLUCOSE SERPL-MCNC: 90 MG/DL
HCT VFR BLD AUTO: 28.5 %
HGB BLD-MCNC: 9 G/DL
INR PPP: 1.1
LACTATE SERPL-SCNC: 1.2 MMOL/L
LYMPHOCYTES # BLD AUTO: 0.2 K/UL
LYMPHOCYTES NFR BLD: 28.6 %
MAGNESIUM SERPL-MCNC: 1.6 MG/DL
MCH RBC QN AUTO: 27.7 PG
MCHC RBC AUTO-ENTMCNC: 31.6 G/DL
MCV RBC AUTO: 88 FL
MONOCYTES # BLD AUTO: 0 K/UL
MONOCYTES NFR BLD: 1.6 %
NEUTROPHILS # BLD AUTO: 0.4 K/UL
NEUTROPHILS NFR BLD: 69.8 %
PHOSPHATE SERPL-MCNC: 3.5 MG/DL
PLATELET # BLD AUTO: 5 K/UL
PMV BLD AUTO: ABNORMAL FL
POTASSIUM SERPL-SCNC: 3.5 MMOL/L
PROT SERPL-MCNC: 7.1 G/DL
PROTHROMBIN TIME: 11.5 SEC
RBC # BLD AUTO: 3.25 M/UL
SODIUM SERPL-SCNC: 139 MMOL/L
TRANS PLATPHERESIS VOL PATIENT: NORMAL ML
WBC # BLD AUTO: 0.63 K/UL

## 2018-06-06 PROCEDURE — 85027 COMPLETE CBC AUTOMATED: CPT

## 2018-06-06 PROCEDURE — 93005 ELECTROCARDIOGRAM TRACING: CPT

## 2018-06-06 PROCEDURE — 99285 EMERGENCY DEPT VISIT HI MDM: CPT | Mod: 25

## 2018-06-06 PROCEDURE — 12000002 HC ACUTE/MED SURGE SEMI-PRIVATE ROOM

## 2018-06-06 PROCEDURE — 85007 BL SMEAR W/DIFF WBC COUNT: CPT

## 2018-06-06 PROCEDURE — P9035 PLATELET PHERES LEUKOREDUCED: HCPCS

## 2018-06-06 PROCEDURE — 96365 THER/PROPH/DIAG IV INF INIT: CPT

## 2018-06-06 PROCEDURE — 25000003 PHARM REV CODE 250: Performed by: EMERGENCY MEDICINE

## 2018-06-06 PROCEDURE — 36430 TRANSFUSION BLD/BLD COMPNT: CPT

## 2018-06-06 PROCEDURE — 85610 PROTHROMBIN TIME: CPT

## 2018-06-06 PROCEDURE — 80053 COMPREHEN METABOLIC PANEL: CPT

## 2018-06-06 PROCEDURE — 84100 ASSAY OF PHOSPHORUS: CPT

## 2018-06-06 PROCEDURE — 83735 ASSAY OF MAGNESIUM: CPT

## 2018-06-06 PROCEDURE — 96366 THER/PROPH/DIAG IV INF ADDON: CPT

## 2018-06-06 PROCEDURE — 11000001 HC ACUTE MED/SURG PRIVATE ROOM

## 2018-06-06 PROCEDURE — 86850 RBC ANTIBODY SCREEN: CPT

## 2018-06-06 PROCEDURE — 63600175 PHARM REV CODE 636 W HCPCS: Performed by: EMERGENCY MEDICINE

## 2018-06-06 PROCEDURE — 96375 TX/PRO/DX INJ NEW DRUG ADDON: CPT

## 2018-06-06 PROCEDURE — 83605 ASSAY OF LACTIC ACID: CPT

## 2018-06-06 PROCEDURE — 87040 BLOOD CULTURE FOR BACTERIA: CPT | Mod: 59

## 2018-06-06 PROCEDURE — 93010 ELECTROCARDIOGRAM REPORT: CPT | Mod: ,,, | Performed by: INTERNAL MEDICINE

## 2018-06-06 RX ORDER — DIAZEPAM 2 MG/1
2 TABLET ORAL
Status: COMPLETED | OUTPATIENT
Start: 2018-06-06 | End: 2018-06-06

## 2018-06-06 RX ORDER — ONDANSETRON 8 MG/1
8 TABLET, ORALLY DISINTEGRATING ORAL EVERY 8 HOURS PRN
Status: DISCONTINUED | OUTPATIENT
Start: 2018-06-06 | End: 2018-06-08 | Stop reason: HOSPADM

## 2018-06-06 RX ORDER — CIPROFLOXACIN 2 MG/ML
400 INJECTION, SOLUTION INTRAVENOUS
Status: COMPLETED | OUTPATIENT
Start: 2018-06-06 | End: 2018-06-06

## 2018-06-06 RX ORDER — ACETAMINOPHEN 325 MG/1
650 TABLET ORAL EVERY 8 HOURS PRN
Status: DISCONTINUED | OUTPATIENT
Start: 2018-06-06 | End: 2018-06-08 | Stop reason: HOSPADM

## 2018-06-06 RX ORDER — CEFTRIAXONE 1 G/1
1 INJECTION, POWDER, FOR SOLUTION INTRAMUSCULAR; INTRAVENOUS
Status: COMPLETED | OUTPATIENT
Start: 2018-06-06 | End: 2018-06-06

## 2018-06-06 RX ORDER — DULOXETIN HYDROCHLORIDE 30 MG/1
30 CAPSULE, DELAYED RELEASE ORAL 2 TIMES DAILY
Status: DISCONTINUED | OUTPATIENT
Start: 2018-06-06 | End: 2018-06-08 | Stop reason: HOSPADM

## 2018-06-06 RX ORDER — DOCUSATE SODIUM 100 MG/1
100 CAPSULE, LIQUID FILLED ORAL 2 TIMES DAILY
Status: DISCONTINUED | OUTPATIENT
Start: 2018-06-06 | End: 2018-06-08 | Stop reason: HOSPADM

## 2018-06-06 RX ORDER — HYDROCODONE BITARTRATE AND ACETAMINOPHEN 500; 5 MG/1; MG/1
TABLET ORAL
Status: DISCONTINUED | OUTPATIENT
Start: 2018-06-06 | End: 2018-06-07

## 2018-06-06 RX ORDER — GABAPENTIN 100 MG/1
100 CAPSULE ORAL 3 TIMES DAILY
Status: DISCONTINUED | OUTPATIENT
Start: 2018-06-07 | End: 2018-06-08 | Stop reason: HOSPADM

## 2018-06-06 RX ORDER — CIPROFLOXACIN 2 MG/ML
400 INJECTION, SOLUTION INTRAVENOUS
Status: DISCONTINUED | OUTPATIENT
Start: 2018-06-07 | End: 2018-06-08 | Stop reason: HOSPADM

## 2018-06-06 RX ORDER — CEFTRIAXONE 1 G/1
1 INJECTION, POWDER, FOR SOLUTION INTRAMUSCULAR; INTRAVENOUS
Status: DISCONTINUED | OUTPATIENT
Start: 2018-06-06 | End: 2018-06-06 | Stop reason: CLARIF

## 2018-06-06 RX ORDER — FAMOTIDINE 20 MG/1
20 TABLET, FILM COATED ORAL 2 TIMES DAILY
Status: DISCONTINUED | OUTPATIENT
Start: 2018-06-06 | End: 2018-06-08 | Stop reason: HOSPADM

## 2018-06-06 RX ADMIN — CIPROFLOXACIN 400 MG: 2 INJECTION, SOLUTION INTRAVENOUS at 07:06

## 2018-06-06 RX ADMIN — DIAZEPAM 2 MG: 2 TABLET ORAL at 07:06

## 2018-06-06 RX ADMIN — CEFTRIAXONE SODIUM 1 G: 1 INJECTION, POWDER, FOR SOLUTION INTRAMUSCULAR; INTRAVENOUS at 07:06

## 2018-06-06 NOTE — ED PROVIDER NOTES
Encounter Date: 2018       History     Chief Complaint   Patient presents with    Abnormal Lab     Hx lung cancer. Pt reports being called by oncologist due to low platelet and WBC levels. Denies SOB. Pt usually on 2L at home     Ms Herrera has a hx of metastatic lung cancer with mets to spine that resulted in spinal surgery in March.  She has a history of radiation treatment in late April or early May, completed.  She has been followed by Dr. Olsen.  She has had to doses of chemotherapy, this weeks round was canceled due to low counts.  She went to the office this morning to have blood drawn and was called this afternoon and told her counts are low and she needed to go to the ER.  She uses 2 L of home O2 by nasal cannula at all times at baseline.  She reports she feels aggravated.  She denies worsening shortness of breath from her baseline.  She does note a rash to arms and legs that began a couple of days ago and has worsened in the last 24 hr.  She denies any overt bleeding at any time.  She denies chest pain or abdominal pain.      The history is provided by the patient and the spouse.     Review of patient's allergies indicates:  No Known Allergies  Past Medical History:   Diagnosis Date    Anxiety     Cancer     lung     Cancer, metastatic to bone     Depression     Insomnia     Pressure ulcer     sacral    Radiation burn     chest    Requires assistance with all daily activities     Spinal compression fracture     T10 & L5    Status post radiation therapy 2018    Weight loss, unintentional      Past Surgical History:   Procedure Laterality Date     SECTION      HYSTERECTOMY      PORTACATH PLACEMENT Right 2018    THORACIC FUSION       Family History   Problem Relation Age of Onset    Diabetes Father     Hypertension Father     Diabetes Mother      Social History   Substance Use Topics    Smoking status: Former Smoker     Packs/day: 0.25     Years: 20.00     Smokeless tobacco: Never Used    Alcohol use Yes     Review of Systems   Respiratory:        Positive baseline shortness of breath.   Cardiovascular: Negative.    Skin: Positive for rash.   All other systems reviewed and are negative.      Physical Exam     Initial Vitals [06/06/18 1645]   BP Pulse Resp Temp SpO2   128/82 92 16 98 °F (36.7 °C) (!) 79 %      MAP       97.33         Physical Exam    Nursing note and vitals reviewed.  Constitutional: She appears well-developed and well-nourished.   HENT:   Head: Normocephalic and atraumatic.   Eyes: Conjunctivae and EOM are normal.   Neck: Normal range of motion. Neck supple.   Cardiovascular: Normal rate, regular rhythm and normal heart sounds.   No murmur heard.  Pulmonary/Chest: Breath sounds normal. No respiratory distress. She has no wheezes. She has no rhonchi. She has no rales.   On nasal cannula, 3 L. no respiratory distress.   Abdominal: Soft. She exhibits no distension. There is no tenderness. There is no rebound.   Musculoskeletal: Normal range of motion. She exhibits no edema.   Neurological: She is alert. No cranial nerve deficit or sensory deficit.   Skin: Skin is warm. Rash noted.   Petechial rash to arms and legs, coalescing on legs significantly.   Psychiatric: She has a normal mood and affect. Thought content normal.         ED Course   Critical Care  Date/Time: 6/6/2018 8:52 PM  Performed by: OLGA TABOR.  Authorized by: OLGA TABOR   Direct patient critical care time: 10 minutes  Ordering / reviewing critical care time: 10 minutes  Documentation critical care time: 10 minutes  Consulting other physicians critical care time: 10 minutes  Total critical care time (exclusive of procedural time) : 40 minutes        Labs Reviewed   COMPREHENSIVE METABOLIC PANEL - Abnormal; Notable for the following:        Result Value    CO2 31 (*)     BUN, Bld 7 (*)     Albumin 2.4 (*)      (*)     ALT 73 (*)     All other components within normal  limits   CBC W/ AUTO DIFFERENTIAL - Abnormal; Notable for the following:     WBC 0.63 (*)     RBC 3.25 (*)     Hemoglobin 9.0 (*)     Hematocrit 28.5 (*)     MCHC 31.6 (*)     RDW 17.5 (*)     Platelets 5 (*)     Gran # (ANC) 0.4 (*)     Lymph # 0.2 (*)     Mono # 0.0 (*)     Mono% 1.6 (*)     All other components within normal limits    Narrative:        WBC and Platelets count critical result(s) called and verbal   readback obtained from Shandra Castillo at 19:15, 06/06/2018 19:20  Recoll. [id number] by ADK at 06/06/2018 18:35, reason: toverified   results 06/06/2018  18:35   CULTURE, BLOOD   CULTURE, BLOOD   PROTIME-INR   MAGNESIUM   PHOSPHORUS   LACTIC ACID, PLASMA   TYPE & SCREEN   PREPARE PLATELETS (DOSE) SOFT     EKG Readings: (Independently Interpreted)   Initial Reading: No STEMI. Rhythm: Normal Sinus Rhythm. Ectopy: No Ectopy. Conduction: Normal.       X-Ray Chest 1 View   Final Result      Small right pleural effusion with right basilar atelectasis.  Redemonstration of ovoid soft tissue mass involving the right lateral chest wall as seen on previous CT.         Electronically signed by: Deo Howard MD   Date:    06/06/2018   Time:    17:56           Medical Decision Making:   Clinical Tests:   Lab Tests: Ordered and Reviewed  Radiological Study: Ordered and Reviewed  ED Management:  Ms Herrera has been stable during her time in the ER.   At one point, she reported she was getting anxious and needed something for anxiety, reports she takes xanax prn. sats wnl, no suspected acute medical distress. Pt given valium po and she felt better appropriately.     Of note sats in triage on RA. Pt is on chronic home o2, 2-3L.     Labs returned, I spoke with Dr. Olsen, he specifically asked for 1 pack of ready platelets, did not want to wait for apheresis or leukoreduction, and asked for rocephin and cipro for prophylaxis. Pt consented for platelets by me.   Pt understands need for admission.   Ms Herrera is  currently stable and appears comfortable. Dick Castillo MD, 06/06/2018 8:51 PM                          Clinical Impression:   The primary encounter diagnosis was Thrombocytopenia. Diagnoses of Hypoxia, Weakness, Other neutropenia, and Metastatic lung cancer (metastasis from lung to other site), unspecified laterality were also pertinent to this visit.                             Dick Castillo MD  06/06/18 4750

## 2018-06-06 NOTE — ED TRIAGE NOTES
Pt to the ED via personal vehicle with c/o low platelets and WBC. Pt was called by MD to reports low blood and to come to ED. Pt reports hx of met lung CA. Pt reports on home O2, 2 L NC. Upon arrival to ED pt 74% on RA. Pt placed on 3L NC with increased O2 sat to 97%. Pt placed on cardiac monitor. Pt denies SOB. Pt reports back pain. No acute distress noted.

## 2018-06-07 LAB
ALBUMIN SERPL BCP-MCNC: 2.3 G/DL
ALP SERPL-CCNC: 77 U/L
ALT SERPL W/O P-5'-P-CCNC: 64 U/L
ANION GAP SERPL CALC-SCNC: 8 MMOL/L
AST SERPL-CCNC: 97 U/L
BASOPHILS # BLD AUTO: 0 K/UL
BASOPHILS NFR BLD: 0 %
BILIRUB SERPL-MCNC: 0.5 MG/DL
BLD PROD TYP BPU: NORMAL
BLOOD UNIT EXPIRATION DATE: NORMAL
BLOOD UNIT TYPE CODE: 5100
BLOOD UNIT TYPE: NORMAL
BUN SERPL-MCNC: 5 MG/DL
CALCIUM SERPL-MCNC: 8.9 MG/DL
CHLORIDE SERPL-SCNC: 98 MMOL/L
CO2 SERPL-SCNC: 31 MMOL/L
CODING SYSTEM: NORMAL
CREAT SERPL-MCNC: 0.6 MG/DL
DIFFERENTIAL METHOD: ABNORMAL
DISPENSE STATUS: NORMAL
EOSINOPHIL # BLD AUTO: 0 K/UL
EOSINOPHIL NFR BLD: 0 %
ERYTHROCYTE [DISTWIDTH] IN BLOOD BY AUTOMATED COUNT: 17.1 %
ERYTHROCYTE [DISTWIDTH] IN BLOOD BY AUTOMATED COUNT: 17.3 %
EST. GFR  (AFRICAN AMERICAN): >60 ML/MIN/1.73 M^2
EST. GFR  (NON AFRICAN AMERICAN): >60 ML/MIN/1.73 M^2
GLUCOSE SERPL-MCNC: 99 MG/DL
HCT VFR BLD AUTO: 26.3 %
HCT VFR BLD AUTO: 28.6 %
HGB BLD-MCNC: 8.3 G/DL
HGB BLD-MCNC: 9.2 G/DL
LYMPHOCYTES # BLD AUTO: 0.1 K/UL
LYMPHOCYTES NFR BLD: 22.2 %
MCH RBC QN AUTO: 27.2 PG
MCH RBC QN AUTO: 27.4 PG
MCHC RBC AUTO-ENTMCNC: 31.6 G/DL
MCHC RBC AUTO-ENTMCNC: 32.2 G/DL
MCV RBC AUTO: 85 FL
MCV RBC AUTO: 87 FL
MONOCYTES # BLD AUTO: 0 K/UL
MONOCYTES NFR BLD: 5.6 %
NEUTROPHILS # BLD AUTO: 0.4 K/UL
NEUTROPHILS NFR BLD: 72.2 %
NUM UNITS TRANS WBC-POOR PLATPHERESIS: NORMAL
PLATELET # BLD AUTO: 28 K/UL
PLATELET # BLD AUTO: 31 K/UL
PLATELET BLD QL SMEAR: ABNORMAL
PMV BLD AUTO: 12.6 FL
PMV BLD AUTO: ABNORMAL FL
POTASSIUM SERPL-SCNC: 3.3 MMOL/L
PROT SERPL-MCNC: 6.5 G/DL
RBC # BLD AUTO: 3.03 M/UL
RBC # BLD AUTO: 3.38 M/UL
SODIUM SERPL-SCNC: 137 MMOL/L
WBC # BLD AUTO: 0.54 K/UL
WBC # BLD AUTO: 1.86 K/UL

## 2018-06-07 PROCEDURE — 85025 COMPLETE CBC W/AUTO DIFF WBC: CPT

## 2018-06-07 PROCEDURE — 25000003 PHARM REV CODE 250: Performed by: EMERGENCY MEDICINE

## 2018-06-07 PROCEDURE — 25000003 PHARM REV CODE 250: Performed by: INTERNAL MEDICINE

## 2018-06-07 PROCEDURE — 36415 COLL VENOUS BLD VENIPUNCTURE: CPT

## 2018-06-07 PROCEDURE — 85027 COMPLETE CBC AUTOMATED: CPT

## 2018-06-07 PROCEDURE — 80053 COMPREHEN METABOLIC PANEL: CPT

## 2018-06-07 PROCEDURE — 63600175 PHARM REV CODE 636 W HCPCS: Performed by: INTERNAL MEDICINE

## 2018-06-07 PROCEDURE — 11000001 HC ACUTE MED/SURG PRIVATE ROOM

## 2018-06-07 PROCEDURE — 63600175 PHARM REV CODE 636 W HCPCS: Performed by: EMERGENCY MEDICINE

## 2018-06-07 PROCEDURE — 25000003 PHARM REV CODE 250

## 2018-06-07 PROCEDURE — P9037 PLATE PHERES LEUKOREDU IRRAD: HCPCS

## 2018-06-07 RX ORDER — LORAZEPAM 0.5 MG/1
0.5 TABLET ORAL ONCE
Status: COMPLETED | OUTPATIENT
Start: 2018-06-07 | End: 2018-06-07

## 2018-06-07 RX ORDER — HYDROCODONE BITARTRATE AND ACETAMINOPHEN 500; 5 MG/1; MG/1
TABLET ORAL
Status: DISCONTINUED | OUTPATIENT
Start: 2018-06-07 | End: 2018-06-08 | Stop reason: HOSPADM

## 2018-06-07 RX ADMIN — DULOXETINE 30 MG: 30 CAPSULE, DELAYED RELEASE ORAL at 12:06

## 2018-06-07 RX ADMIN — CIPROFLOXACIN 400 MG: 2 INJECTION, SOLUTION INTRAVENOUS at 07:06

## 2018-06-07 RX ADMIN — LORAZEPAM 0.5 MG: 0.5 TABLET ORAL at 05:06

## 2018-06-07 RX ADMIN — FAMOTIDINE 20 MG: 20 TABLET ORAL at 12:06

## 2018-06-07 RX ADMIN — TBO-FILGRASTIM 300 MCG: 300 INJECTION, SOLUTION SUBCUTANEOUS at 08:06

## 2018-06-07 RX ADMIN — DULOXETINE 30 MG: 30 CAPSULE, DELAYED RELEASE ORAL at 08:06

## 2018-06-07 RX ADMIN — ACETAMINOPHEN 650 MG: 325 TABLET, FILM COATED ORAL at 02:06

## 2018-06-07 RX ADMIN — GABAPENTIN 100 MG: 100 CAPSULE ORAL at 08:06

## 2018-06-07 RX ADMIN — OXYCODONE HYDROCHLORIDE 20 MG: 15 TABLET ORAL at 07:06

## 2018-06-07 RX ADMIN — CIPROFLOXACIN 400 MG: 2 INJECTION, SOLUTION INTRAVENOUS at 08:06

## 2018-06-07 RX ADMIN — FAMOTIDINE 20 MG: 20 TABLET ORAL at 08:06

## 2018-06-07 RX ADMIN — SODIUM CHLORIDE: 0.9 INJECTION, SOLUTION INTRAVENOUS at 09:06

## 2018-06-07 RX ADMIN — DOCUSATE SODIUM 100 MG: 100 CAPSULE, LIQUID FILLED ORAL at 08:06

## 2018-06-07 RX ADMIN — ONDANSETRON 8 MG: 8 TABLET, ORALLY DISINTEGRATING ORAL at 11:06

## 2018-06-07 RX ADMIN — OXYCODONE HYDROCHLORIDE 20 MG: 15 TABLET ORAL at 02:06

## 2018-06-07 RX ADMIN — OXYCODONE HYDROCHLORIDE 20 MG: 15 TABLET ORAL at 10:06

## 2018-06-07 RX ADMIN — OXYCODONE HYDROCHLORIDE 20 MG: 15 TABLET ORAL at 11:06

## 2018-06-07 RX ADMIN — GABAPENTIN 100 MG: 100 CAPSULE ORAL at 02:06

## 2018-06-07 RX ADMIN — DOCUSATE SODIUM 100 MG: 100 CAPSULE, LIQUID FILLED ORAL at 12:06

## 2018-06-07 RX ADMIN — OXYCODONE HYDROCHLORIDE 20 MG: 15 TABLET ORAL at 06:06

## 2018-06-07 RX ADMIN — ONDANSETRON 8 MG: 8 TABLET, ORALLY DISINTEGRATING ORAL at 09:06

## 2018-06-07 RX ADMIN — OXYCODONE HYDROCHLORIDE 20 MG: 15 TABLET ORAL at 12:06

## 2018-06-07 RX ADMIN — CEFTRIAXONE 1 G: 1 INJECTION, SOLUTION INTRAVENOUS at 09:06

## 2018-06-07 NOTE — ED NOTES
Blood band called about availability of platelets,  states platelets will be available shortly and will notify ed when available

## 2018-06-07 NOTE — PLAN OF CARE
"SW met with pt and pt's family at bedside. SW explained her role in Care Management. Pt voiced understanding. SW inquired about HELP AT HOME. Pt stated that she will have Spouse and Sister at home to help for support. SW voiced understanding. SW inquired about responsibilities when it comes to  MANAGING HER/HIS HEALTH at home and what it entails. Pt inquired about details. SW informed pt of RESPONSIBILITIES of:    1. Follow up appointments  2. Getting Prescriptions filled  3. Taking medications as prescribed.     Pt voiced understanding.  SW explained "My Health Packet" blue folder and the pink and green tabs that are on the folder as well. Discharge Brochure given to pt with Care Team information. Pt voiced understanding.     Pt's pharmacy:   [facility] - [address] [address]  Phone: [phone] Fax: [phone]    [facility] [address] [place] [id number]  Phone: [phone] Fax: [phone]    Pt's preference for appointments: Morning Appointments.      06/07/18 1532   Discharge Assessment   Assessment Type Discharge Planning Assessment   Confirmed/corrected address and phone number on facesheet? Yes   Assessment information obtained from? Patient   Prior to hospitilization cognitive status: Alert/Oriented   Prior to hospitalization functional status: Assistive Equipment;Needs Assistance   Current cognitive status: Alert/Oriented   Current Functional Status: Assistive Equipment;Needs Assistance   Lives With spouse  (Spouse Taras and Sister Kiah)   Able to Return to Prior Arrangements yes   Is patient able to care for self after discharge? Yes   Who are your caregiver(s) and their phone number(s)? spouse Taras and sister Kiah (3 days a week)   Patient's perception of discharge disposition home or selfcare   Readmission Within The Last 30 Days no previous admission in last 30 " days   Equipment Currently Used at Home 3-in-1 commode;walker, rolling;oxygen;wheelchair;hospital bed   Do you have any problems affording any of your prescribed medications? No   Is the patient taking medications as prescribed? yes   Does the patient have transportation home? Yes   Transportation Available family or friend will provide   Discharge Plan A Home with family;Home Health  ([facility] Health is with [facility]. [facility] [place] [phone], PT/OT [place] [phone], and [place].)   Discharge Plan B Home with family;Home Health  (Prefer Morning Appointments.)   Does the patient have transportation to healthcare appointments? Yes
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
## 2018-06-07 NOTE — PROGRESS NOTES
" [facility] [place]  Adult Nutrition  Progress Note    SUMMARY       Recommendations    Recommendation/Intervention:   1. Continue regular diet.   2. Consider an appetite if poor po continues.  3. RD to monitor    Goals:   1. Pt will progress towards >75% consumption of EEN    Nutrition Goal Status: new  Communication of RD Recs: other (comment)    D/C Planning: regular diet    Reason for Assessment    Reason for Assessment: identified at risk by screening criteria  Relevant Medical History: unintentional wt loss per cancer    General Information Comments: Pt states appetite is okay, experiencing nausea sometimes. No v/c/s issues. Pt does not want ONS. Pt states consuming 50% meals.    Nutrition Risk Screen    Nutrition Risk Screen: other (see comments) (dyspnea on exertion, hx of lung cancer)    Nutrition/Diet History    Patient Reported Diet/Restrictions/Preferences: general  Do you have any cultural, spiritual, Alevism conflicts, given your current situation?: no  Food Allergies: NKFA    Anthropometrics    Temp: 96.5 °F (35.8 °C)  Height Method: Stated  Height: 5' 2" (157.5 cm)  Height (inches): 62 in  Weight Method: Bed Scale  Weight: 51.3 kg (113 lb 0.1 oz)  Weight (lb): 113 lb  Ideal Body Weight (IBW), Female: 110 lb  % Ideal Body Weight, Female (lb): 102.73 lb  BMI (Calculated): 20.7    Lab/Procedures/Meds    Pertinent Labs Reviewed: reviewed  Pertinent Labs Comments: K 3.3  Pertinent Medications Reviewed: reviewed  Pertinent Medications Comments: famotidine    Physical Findings/Assessment    Overall Physical Appearance: weak  Skin: pressure ulcer(s) (4 ulcers on L buttocks)    Estimated/Assessed Needs    Weight Used For Calorie Calculations: 51.3 kg (113 lb 1.5 oz)  Energy Calorie Requirements (kcal): 2398-4836  Energy Need Method: Kcal/kg (30-35kcal/kg (rec for wound healing))  Protein Requirements: 62-77g (1.2-1.5g/kg)  Weight Used For Protein Calculations: 51.3 kg (113 lb 1.5 oz)  Fluid " Requirements (mL):  (per MD)  RDA Method (mL): 1540    Nutrition Prescription Ordered    Current Diet Order: regular    Evaluation of Received Nutrient/Fluid Intake    I/O: 984/100  Energy Calories Required: meeting needs  Protein Required: meeting needs  Comments: LBM 6/4, Musa 12  Tolerance: tolerating  % Intake of Estimated Energy Needs: Other: 50%  % Meal Intake: Other: 50%    Nutrition Risk    Level of Risk/Frequency of Follow-up:  (1x/wk)     Assessment and Plan    Inadequate oral intake r/t lack of desire to consume nutrition AEB po intake 50%    Monitor and Evaluation    Food and Nutrient Intake: energy intake, food and beverage intake  Food and Nutrient Adminstration: diet order  Knowledge/Beliefs/Attitudes: food and nutrition knowledge/skill  Physical Activity and Function: nutrition-related ADLs and IADLs  Anthropometric Measurements: weight, weight change  Biochemical Data, Medical Tests and Procedures: electrolyte and renal panel, gastrointestinal profile  Nutrition-Focused Physical Findings: overall appearance     Nutrition Follow-Up    RD Follow-up?: Yes      Sophia Ambriz, dietetic intern

## 2018-06-07 NOTE — ED NOTES
Patient transported to floor with transport team via stretcher, on monitor, with family, with chart, on oxygen, with personal belongings, with neutropenia precautions

## 2018-06-07 NOTE — PLAN OF CARE
Recommendations     Recommendation/Intervention:   1. Continue regular diet.   2. Consider an appetite if poor po continues.  3. RD to monitor     Goals:   1. Pt will progress towards >75% consumption of EEN     Nutrition Goal Status: new  Communication of RD Recs: other (comment)     D/C Planning: regular diet

## 2018-06-07 NOTE — PLAN OF CARE
Problem: Pressure Ulcer Risk (Musa Scale) (Adult,Obstetrics,Pediatric)  Goal: Skin Integrity  Patient will demonstrate the desired outcomes by discharge/transition of care.   Outcome: Ongoing (interventions implemented as appropriate)  Pt AAOx4; has very high anxiety; ambulates with assist; telebox [id number]; productive cough; IV saline locked; turned q2h; 2x2 stage pressure ulcer on sacral area;  to return at bedside in the morning; safety precautions in place; will continue to monitor

## 2018-06-08 VITALS
HEIGHT: 62 IN | OXYGEN SATURATION: 95 % | TEMPERATURE: 98 F | RESPIRATION RATE: 17 BRPM | BODY MASS INDEX: 20.8 KG/M2 | HEART RATE: 81 BPM | DIASTOLIC BLOOD PRESSURE: 83 MMHG | WEIGHT: 113 LBS | SYSTOLIC BLOOD PRESSURE: 162 MMHG

## 2018-06-08 LAB
ALBUMIN SERPL BCP-MCNC: 2.5 G/DL
ALP SERPL-CCNC: 88 U/L
ALT SERPL W/O P-5'-P-CCNC: 63 U/L
ANION GAP SERPL CALC-SCNC: 10 MMOL/L
AST SERPL-CCNC: 82 U/L
BILIRUB SERPL-MCNC: 0.4 MG/DL
BUN SERPL-MCNC: 3 MG/DL
CALCIUM SERPL-MCNC: 9.2 MG/DL
CHLORIDE SERPL-SCNC: 95 MMOL/L
CO2 SERPL-SCNC: 30 MMOL/L
CREAT SERPL-MCNC: 0.6 MG/DL
EST. GFR  (AFRICAN AMERICAN): >60 ML/MIN/1.73 M^2
EST. GFR  (NON AFRICAN AMERICAN): >60 ML/MIN/1.73 M^2
GLUCOSE SERPL-MCNC: 85 MG/DL
POTASSIUM SERPL-SCNC: 3.2 MMOL/L
PROT SERPL-MCNC: 6.8 G/DL
SODIUM SERPL-SCNC: 135 MMOL/L

## 2018-06-08 PROCEDURE — 63600175 PHARM REV CODE 636 W HCPCS: Performed by: EMERGENCY MEDICINE

## 2018-06-08 PROCEDURE — 80053 COMPREHEN METABOLIC PANEL: CPT

## 2018-06-08 PROCEDURE — 36415 COLL VENOUS BLD VENIPUNCTURE: CPT

## 2018-06-08 PROCEDURE — 63600175 PHARM REV CODE 636 W HCPCS: Performed by: INTERNAL MEDICINE

## 2018-06-08 PROCEDURE — 25000003 PHARM REV CODE 250: Performed by: INTERNAL MEDICINE

## 2018-06-08 PROCEDURE — 25000003 PHARM REV CODE 250: Performed by: EMERGENCY MEDICINE

## 2018-06-08 RX ADMIN — TBO-FILGRASTIM 300 MCG: 300 INJECTION, SOLUTION SUBCUTANEOUS at 08:06

## 2018-06-08 RX ADMIN — ACETAMINOPHEN 650 MG: 325 TABLET, FILM COATED ORAL at 07:06

## 2018-06-08 RX ADMIN — FAMOTIDINE 20 MG: 20 TABLET ORAL at 07:06

## 2018-06-08 RX ADMIN — ACETAMINOPHEN 650 MG: 325 TABLET, FILM COATED ORAL at 01:06

## 2018-06-08 RX ADMIN — DULOXETINE 30 MG: 30 CAPSULE, DELAYED RELEASE ORAL at 07:06

## 2018-06-08 RX ADMIN — DOCUSATE SODIUM 100 MG: 100 CAPSULE, LIQUID FILLED ORAL at 07:06

## 2018-06-08 RX ADMIN — GABAPENTIN 100 MG: 100 CAPSULE ORAL at 07:06

## 2018-06-08 RX ADMIN — ONDANSETRON 8 MG: 8 TABLET, ORALLY DISINTEGRATING ORAL at 08:06

## 2018-06-08 RX ADMIN — OXYCODONE HYDROCHLORIDE 20 MG: 15 TABLET ORAL at 03:06

## 2018-06-08 RX ADMIN — CIPROFLOXACIN 400 MG: 2 INJECTION, SOLUTION INTRAVENOUS at 07:06

## 2018-06-08 RX ADMIN — OXYCODONE HYDROCHLORIDE 20 MG: 15 TABLET ORAL at 07:06

## 2018-06-08 NOTE — NURSING
Patient refusing to allow PCT to take vital signs this AM. Sister and Spouse at the bedside requesting pain medication for the patient. Family and patient educated on the difference between PRN and scheduled medications. Patient and family eager to be discharged. MD notified. Will continue to monitor, will continue with plan of care.

## 2018-06-08 NOTE — PROGRESS NOTES
[facility]  Hematology/Oncology  Progress Note    Patient Name: Marcie Herrera  Admission Date: 6/6/2018  Hospital Length of Stay: 2 days  Code Status: Full Code     Subjective:     Interval History:     06/08/18: feeling better. No fever or chills. Skin lesion stable. Received 2nd dose of platelet last night      Oncology Treatment Plan:   [No treatment plan]    Medications:  Continuous Infusions:  Scheduled Meds:   cefTRIAXone (ROCEPHIN) IVPB  1 g Intravenous Q24H    ciprofloxacin (CIPRO)400mg/200ml D5W IVPB  400 mg Intravenous Q12H    docusate sodium  100 mg Oral BID    DULoxetine  30 mg Oral BID    famotidine  20 mg Oral BID    gabapentin  100 mg Oral TID    tbo-filgrastim  300 mcg Subcutaneous Daily     PRN Meds:sodium chloride, acetaminophen, ondansetron, oxyCODONE     Review of Systems     Constitutional: + fatigue and pain   Head: denies HA, blurred vision  Eyes: no visual changes   ENT: no nasal congestion. Denies sore throat with odynophagia   Respiratory: + CARVALHO.   Cardiovascular: no chest pain or palpitations   Gastrointestinal: constipation  Better.   Hematologic/Lymphatic: see HPI   Musculoskeletal: see HPI. Using walker at home. Getting HH with PT/OT   Neurological: neuropathic pain. Alessio leg weakness, right >L  Skin: see HPI  Psych: anxiety     Objective:     Vital Signs (Most Recent):  Temp: 97.8 °F (36.6 °C) (06/08/18 0527)  Pulse: 81 (06/08/18 0527)  Resp: 17 (06/08/18 0527)  BP: (!) 162/83 (06/08/18 0527)  SpO2: 95 % (06/08/18 0527) Vital Signs (24h Range):  Temp:  [97.7 °F (36.5 °C)-98.3 °F (36.8 °C)] 97.8 °F (36.6 °C)  Pulse:  [] 81  Resp:  [17-20] 17  SpO2:  [90 %-99 %] 95 %  BP: (129-174)/(70-84) 162/83     Weight: 51.3 kg (113 lb 0.1 oz)  Body mass index is 20.67 kg/m².  Body surface area is 1.5 meters squared.      Intake/Output Summary (Last 24 hours) at 06/08/18 0741  Last data filed at 06/08/18 0600   Gross per 24 hour   Intake             1749 ml    Output              900 ml   Net              849 ml       Physical Exam    General: NAD, Sitting up. Sister at the bedside  Head: normocephalic, atraumatic   Eyes: conjunctivae pale anicteric sclera.   Throat: No erythema or post nasal discharge   Neck: supple, no LAD   Lungs: rhonchi bilaterally   Heart: S1,s2, RR    Abdomen: + BS x 4 quadrants,right upper quadrant tenderness.   Lymphatics: No cervical, axillary or inguinal lymphadenopathy   Extremities: trace pedal edema   Skin: ecchymose radha LE and ecchymoses   MS: Thoracol lumbar surgical region clean and healed up. ROM of back better   Neur: A&O x3, bilt LE weakness   Psy: anxious and irritable     Significant Labs:   CBC: No results for input(s): WBC, HGB, HCT, PLT in the last 48 hours., CMP: No results for input(s): NA, K, CL, CO2, GLU, BUN, CREATININE, CALCIUM, PROT, ALBUMIN, BILITOT, ALKPHOS, AST, ALT, ANIONGAP, EGFRNONAA in the last 48 hours.    Invalid input(s): ESTGFAFRICA, Coagulation: No results for input(s): PT, INR, APTT in the last 48 hours., Haptoglobin: No results for input(s): HAPTOGLOBIN in the last 48 hours., LDH: No results for input(s): LDHCSF, BFSOURCE in the last 48 hours., Reticulocytes: No results for input(s): RETIC in the last 48 hours., Tumor Markers: No results for input(s): PSA, CEA, , AFPTM, [id number],  in the last 48 hours.    Invalid input(s): ALGTM, Uric Acid No results for input(s): URICACID in the last 48 hours. and Urine Studies: No results for input(s): COLORU, APPEARANCEUA, PHUR, SPECGRAV, PROTEINUA, GLUCUA, KETONESU, BILIRUBINUA, OCCULTUA, NITRITE, UROBILINOGEN, LEUKOCYTESUR, RBCUA, WBCUA, BACTERIA, SQUAMEPITHEL, HYALINECASTS in the last 48 hours.    Invalid input(s): WRIGHTSUR     Ref. Range 6/8/2018 05:17   Sodium Latest Ref Range: 136 - 145 mmol/L 135 (L)   Potassium Latest Ref Range: 3.5 - 5.1 mmol/L 3.2 (L)   Chloride Latest Ref Range: 95 - 110 mmol/L 95   CO2 Latest Ref Range: 23 - 29 mmol/L 30 (H)   Anion  Gap Latest Ref Range: 8 - 16 mmol/L 10   BUN, Bld Latest Ref Range: 8 - 23 mg/dL 3 (L)   Creatinine Latest Ref Range: 0.5 - 1.4 mg/dL 0.6   eGFR if non African American Latest Ref Range: >60 mL/min/1.73 m^2 >60   eGFR if  Latest Ref Range: >60 mL/min/1.73 m^2 >60   Glucose Latest Ref Range: 70 - 110 mg/dL 85   Calcium Latest Ref Range: 8.7 - 10.5 mg/dL 9.2   Alkaline Phosphatase Latest Ref Range: 55 - 135 U/L 88   Total Protein Latest Ref Range: 6.0 - 8.4 g/dL 6.8   Albumin Latest Ref Range: 3.5 - 5.2 g/dL 2.5 (L)   Total Bilirubin Latest Ref Range: 0.1 - 1.0 mg/dL 0.4   AST Latest Ref Range: 10 - 40 U/L 82 (H)   ALT Latest Ref Range: 10 - 44 U/L 63 (H)       Diagnostic Results:      Assessment/Plan:     Active Diagnoses:    Diagnosis Date Noted POA    PRINCIPAL PROBLEM:  Chemotherapy-induced thrombocytopenia [D69.59, T45.1X5A] 06/06/2018 Yes    Thrombocytopenia [D69.6] 06/06/2018 Yes    Antineoplastic chemotherapy induced pancytopenia [D61.810, T45.1X5A] 06/06/2018 Yes    Cancer associated pain [G89.3] 06/06/2018 Yes    Bone metastases [C79.51] 03/20/2018 Yes      Problems Resolved During this Admission:    Diagnosis Date Noted Date Resolved POA       Mrs. Herrera is a pleasant 62 YO lady with stage IV Non small cell Lung Ca with bone mets undergoing palliative chemotherapy with [place] + Car- day # 17 with pancytopenia - severe thrombocytopenia and petechia and ecchymoses of radha LE      1. Thrombocytopenia: due to chemotherapy               - s/p 2 dose of platelet infusion              - ecchymoses and petechia better   - dc home today      2. Neutropenia: chemo induced              - Granix injection x2    - ok to dc home    - given empiric ABX therapy      3. Stage IV Small Cell Lung CA: chemo on hold     4. Cancer associated pain: resumed home dose of oxy     5. Bone mets: s/p neuro surgical intervention              - fall precaution        Elder Olsen MD  Hematology/Oncology  [facility]

## 2018-06-08 NOTE — PLAN OF CARE
Problem: Patient Care Overview  Goal: Plan of Care Review  Outcome: Ongoing (interventions implemented as appropriate)  Pt AAOx4; has very high anxiety; ambulates with assist; productive cough; IV saline locked; 2x2 stage pressure ulcer on sacral area;  to return at bedside in the morning; safety precautions in place; will continue to monitor

## 2018-06-08 NOTE — NURSING
Patient discharged per MD order. VSS. Afebrile. No complaints of SOB, nausea, or diarrhea. Patient left via wheelchair per transport to family vehicle. No distress noted. IV removed, catheter tip intact. Bleeding controlled with gauze and coban.

## 2018-06-08 NOTE — PLAN OF CARE
06/08/18 0828   Final Note   Assessment Type Final Discharge Note   Discharge Disposition Home   What phone number can be called within the next 1-3 days to see how you are doing after discharge? ([phone])   Hospital Follow Up  Appt(s) scheduled? Yes   Discharge plans and expectations educations in teach back method with documentation complete? Yes   Right Care Referral Info   Post Acute Recommendation No Care   Nurse, Karishma called stating patient was asking to leave.  TN notified Karishma cain to DC from a CM standpoint.

## 2018-06-11 LAB
BACTERIA BLD CULT: NORMAL
BACTERIA BLD CULT: NORMAL

## 2018-06-14 DIAGNOSIS — Z11.59 NEED FOR HEPATITIS C SCREENING TEST: ICD-10-CM

## 2018-06-14 PROBLEM — T45.1X5A CHEMOTHERAPY-INDUCED NAUSEA: Status: ACTIVE | Noted: 2018-06-14

## 2018-06-14 PROBLEM — E87.6 HYPOKALEMIA: Status: ACTIVE | Noted: 2018-06-14

## 2018-06-14 PROBLEM — T45.1X5A CHEMOTHERAPY INDUCED NEUTROPENIA: Status: ACTIVE | Noted: 2018-06-14

## 2018-06-14 PROBLEM — D70.1 CHEMOTHERAPY INDUCED NEUTROPENIA: Status: ACTIVE | Noted: 2018-06-14

## 2018-06-14 PROBLEM — R11.0 CHEMOTHERAPY-INDUCED NAUSEA: Status: ACTIVE | Noted: 2018-06-14

## 2018-06-14 NOTE — DISCHARGE SUMMARY
[facility]  Hematology/Oncology  Discharge Summary      Patient Name: Marcie Herrera  MRN: [id number]  Admission Date: 6/6/2018  Hospital Length of Stay: 2 days  Discharge Date and Time: 6/8/2018  8:52 AM  Attending Physician: No att. providers found   Discharging Provider: Elder Olsen MD  Primary Care Provider: Daniel Joyce MD    HPI:     HPI:      DX: Metastatic Adeno Ca of  Unknown origin: Most probably Lung primary      [place]  1. osterolateral fusion T3, 4, 5, 6, 7, 8, 9, 10, 11, 12.  Posterior segmental instrumentation T3 through T12 as well as open biopsy tumor T10 on 03/26     2. Radiation therapy to lumbar and sacral region: 10/10 completed on 04/18  3. Radiation therapy to Thoracic region: started 04/18 (Dr. Guajardo is planning 10 tx).   4. Cascade + Carb: : s/p cycle 1     HPI     Mrs. Herrera is a pleasant 64 YO lady with with hx of tobacco abuse since age of 19- about 1 pack x 40 yrs- quit about 8 months ago. Has been in her usual state of gulshan until Dec 2017. Began to have lower back pain. Missed a step going down stairs and began to have right  Buttock and right leg pain. Seen at [facility] and underwent evaluation with bone scan- which showed Multiple areas of uptake at the joints, most suggestive of degenerative change/ osteoarthritis. Additional uptake involving the right ribs. Further work up with CT showed multiple bone lesions. She was seen in my clinic on 03/20 and underwent CT of thoraco lumbar spine (see result bellow). Pt was found to have compression fracture of T10, L5 with suspected spinal canal encroachment at T10 and L4 level. Pt was admitted emergently to [facility] with neuro surgical consultation on 03/24. MRI revealed metastatic cancer T5 and T10 with spinal stenosis, spinal cord compression, spinal instability.Pt underwent p. Pt did well post op and was started on radiation therapy to lumbar and pelvic lesions while admitted inpatient. Pt subsequently discharge to Inpatient  rehab at  on 04/06. Pt continue radiation therapy while in the rehab and discharged home on 04/13/2018. Pt completed XRT to lumbar and sacrum on today and began radiation therapy to thoracic region. Wound healed up nicely. Continue to have moderate to severe pain. Depressed mood. Appetite good. + Fatigue.      Pt completed cycle 1 of [place] + Carbo seen me in the clinic for follow up. She was found to have skin rashes to LE radha R >L (patechea and ecchymoses). STAT labs revealed platelet of 7 K and WBC < 1. Pt was instructed to come to [facility] ED for admission and blood product administration.       * No surgery found *     Hospital Course:     During this hospitalization, these following conditions were addressed and managed along with other comorbid conditions:      Mrs. Herrera is a pleasant 62 YO lady with stage IV Non small cell Lung Ca with bone mets undergoing palliative chemotherapy with [place] + Car- day # 17 with pancytopenia - severe thrombocytopenia and petechia and ecchymoses of radha LE      1. Thrombocytopenia: due to chemotherapy               - s/p 2 dose of platelet infusion              - ecchymoses and petechia better   - dc home today      2. Neutropenia: chemo induced              - Granix injection x2    - ok to dc home    - given empiric ABX therapy      3. Stage IV Small Cell Lung CA: chemo on hold     4. Cancer associated pain: resumed home dose of oxy     5. Bone mets: s/p neuro surgical intervention              - fall precaution    Consults:     Significant Diagnostic Studies: see above    Pending Diagnostic Studies:     None        Final Active Diagnoses:    Diagnosis Date Noted POA    PRINCIPAL PROBLEM:  Chemotherapy-induced thrombocytopenia [D69.59, T45.1X5A] 06/06/2018 Yes    Thrombocytopenia [D69.6] 06/06/2018 Yes    Antineoplastic chemotherapy induced pancytopenia [D61.810, T45.1X5A] 06/06/2018 Yes    Cancer associated pain [G89.3] 06/06/2018 Yes    Bone metastases [C79.51] 03/20/2018  Yes      Problems Resolved During this Admission:    Diagnosis Date Noted Date Resolved POA      Discharged Condition: stable    Disposition: Home or Self Care     Activity: as tolerated    Diet: high protein     Follow Up:  Follow-up Information     Elder Olsen MD In 3 days.    Specialties:  Internal Medicine, Oncology, Hematology and Oncology  Contact information:  [address] [facility]  [address]  [place] [address]  [phone]                 Patient Instructions:   No discharge procedures on file.  Medications:  Reconciled Home Medications:      Medication List      CONTINUE taking these medications    docusate sodium 100 MG capsule  Commonly known as:  COLACE  Take 100 mg by mouth 2 (two) times daily.     DULoxetine 30 MG capsule  Commonly known as:  CYMBALTA  Take 1 capsule (30 mg total) by mouth 2 (two) times daily.     fentaNYL 75 mcg/hr  Commonly known as:  DURAGESIC  Place 1 patch onto the skin every 72 hours.     gabapentin 100 MG capsule  Commonly known as:  NEURONTIN  Take 1 capsule (100 mg total) by mouth 3 (three) times daily.     ibuprofen 800 MG tablet  Commonly known as:  ADVIL,MOTRIN  Take 1 tablet (800 mg total) by mouth 2 (two) times daily as needed for Temperature greater than.     mirtazapine 15 MG tablet  Commonly known as:  REMERON  Take 15 mg by mouth every evening.     ondansetron 4 MG Tbdl  Commonly known as:  ZOFRAN-ODT  Take 8 mg by mouth every 12 (twelve) hours as needed.     oxyCODONE 20 mg Tab immediate release tablet  Commonly known as:  ROXICODONE  Take 1 tablet (20 mg total) by mouth every 6 (six) hours as needed for Pain.     tiZANidine 4 MG tablet  Commonly known as:  ZANAFLEX  TK 1 T PO  BID        STOP taking these medications    traZODone 100 MG tablet  Commonly known as:  DESYREL            Elder Olsen MD  Hematology/Oncology  [facility]

## 2018-08-09 ENCOUNTER — APPOINTMENT (OUTPATIENT)
Dept: LAB | Facility: HOSPITAL | Age: 63
End: 2018-08-09
Attending: PATHOLOGY
Payer: COMMERCIAL

## 2019-05-10 DIAGNOSIS — Z12.39 BREAST CANCER SCREENING: ICD-10-CM

## 2021-04-12 ENCOUNTER — PATIENT MESSAGE (OUTPATIENT)
Dept: ADMINISTRATIVE | Facility: HOSPITAL | Age: 66
End: 2021-04-12